# Patient Record
Sex: MALE | Race: WHITE | NOT HISPANIC OR LATINO | Employment: OTHER | URBAN - METROPOLITAN AREA
[De-identification: names, ages, dates, MRNs, and addresses within clinical notes are randomized per-mention and may not be internally consistent; named-entity substitution may affect disease eponyms.]

---

## 2019-01-18 DIAGNOSIS — M54.2 CERVICALGIA: Primary | ICD-10-CM

## 2019-01-28 ENCOUNTER — CLINICAL SUPPORT (OUTPATIENT)
Dept: REHABILITATION | Facility: HOSPITAL | Age: 29
End: 2019-01-28
Attending: FAMILY MEDICINE
Payer: MEDICAID

## 2019-01-28 DIAGNOSIS — M54.50 CHRONIC MIDLINE LOW BACK PAIN WITHOUT SCIATICA: ICD-10-CM

## 2019-01-28 DIAGNOSIS — M54.2 NECK PAIN: ICD-10-CM

## 2019-01-28 DIAGNOSIS — Z98.1 S/P CERVICAL SPINAL FUSION: ICD-10-CM

## 2019-01-28 DIAGNOSIS — G89.29 CHRONIC MIDLINE LOW BACK PAIN WITHOUT SCIATICA: ICD-10-CM

## 2019-01-28 PROCEDURE — 97161 PT EVAL LOW COMPLEX 20 MIN: CPT | Mod: PO

## 2019-01-28 PROCEDURE — 97110 THERAPEUTIC EXERCISES: CPT | Mod: PO
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
## 2019-02-14 ENCOUNTER — TELEPHONE (OUTPATIENT)
Dept: REHABILITATION | Facility: HOSPITAL | Age: 29
End: 2019-02-14

## 2019-02-14 NOTE — TELEPHONE ENCOUNTER
Attempted to contact patient re: No show PT visits.  Received voice mail that indicated his mail box was full and unable to take any messages.

## 2020-03-03 ENCOUNTER — TELEPHONE (OUTPATIENT)
Dept: SURGERY | Facility: HOSPITAL | Age: 30
End: 2020-03-03

## 2020-03-05 ENCOUNTER — ANESTHESIA EVENT (OUTPATIENT)
Dept: SURGERY | Facility: HOSPITAL | Age: 30
End: 2020-03-05
Payer: MEDICAID

## 2020-03-05 ENCOUNTER — HOSPITAL ENCOUNTER (OUTPATIENT)
Dept: PREADMISSION TESTING | Facility: HOSPITAL | Age: 30
Discharge: HOME | End: 2020-03-05
Attending: ORTHOPAEDIC SURGERY
Payer: MEDICAID

## 2020-03-05 DIAGNOSIS — Z01.818 PREOP TESTING: Primary | ICD-10-CM

## 2020-03-05 RX ORDER — SODIUM CHLORIDE, SODIUM LACTATE, POTASSIUM CHLORIDE, CALCIUM CHLORIDE 600; 310; 30; 20 MG/100ML; MG/100ML; MG/100ML; MG/100ML
INJECTION, SOLUTION INTRAVENOUS CONTINUOUS
Status: CANCELLED | OUTPATIENT
Start: 2020-03-05

## 2020-03-05 RX ORDER — LIDOCAINE HYDROCHLORIDE 10 MG/ML
1 INJECTION, SOLUTION EPIDURAL; INFILTRATION; INTRACAUDAL; PERINEURAL ONCE
Status: CANCELLED | OUTPATIENT
Start: 2020-03-05 | End: 2020-03-05

## 2020-03-05 RX ORDER — HYDROCODONE BITARTRATE AND ACETAMINOPHEN 5; 325 MG/1; MG/1
1 TABLET ORAL EVERY 6 HOURS PRN
Status: ON HOLD | COMMUNITY
End: 2020-03-06 | Stop reason: HOSPADM

## 2020-03-05 NOTE — DISCHARGE INSTRUCTIONS
Your surgery is scheduled for 3/6/20.    Please report to Procedure Check In Room on the 2nd FLOOR at 5:30 a.m.       INSTRUCTIONS IMPORTANT!!!  ¨ Do not eat or drink after 12 midnight-including water. OK to brush teeth, no   gum, candy or mints!          ____  Proceed to [facility] on 3/5/20 for additional testing.        ____  No powder, lotions or creams to surgical area.  ____  Please remove all jewelry, including piercings and leave at home.  ____  No money or valuables needed. Please leave at home.  ____  Please bring any documents given by your doctor.  ____  If going home the same day, arrange for a ride home. You will not be able to             drive if Anesthesia was used.  ____  Wear loose fitting clothing. Allow for dressings, bandages.  ____  Stop Aspirin, Ibuprofen, Motrin and Aleve at least 3-5 days before surgery, unless otherwise instructed by your doctor, or the nurse.   You MAY use Tylenol/acetaminophen until day of surgery.  ____  Wash the surgical area with Hibiclens the night before surgery, and again the             morning of surgery.  Be sure to rinse hibiclens off completely (if instructed by   nurse).  ____  If you take diabetic medication, do not take am of surgery unless instructed by Doctor.  ____  Call MD for temperature above 101 degrees or any other signs of infection such as Urinary (bladder) infection, Upper respiratory infection, skin boils, etc.  ____ Stop taking any Fish Oil supplement or any Vitamins that contain Vitamin E at least 5 days prior to surgery.  ____ Do Not wear your contact lenses the day of your procedure.  You may wear your glasses.      ____Do not shave surgical site for 3 days prior to surgery.  ____ Practice Good hand washing before, during, and after procedure.      I have read or had read and explained to me, and understand the above information.  Additional comments or instructions:  For additional questions call [phone]      ANESTHESIA SIDE  EFFECTS  -For the first 24 hours after surgery:  Do not drive, use heavy equipment, make important decisions, or drink alcohol  -It is normal to feel sleepy for several hours.  Rest until you are more awake.  -Have someone stay with you, if needed.  They can watch for problems and help keep you safe.  -Some possible post anesthesia side effects include: nausea and vomiting, sore throat and hoarseness, sleepiness, and dizziness.        Pre-Op Bathing Instructions    Before surgery, you can play an important role in your own health.    Because skin is not sterile, we need to be sure that your skin is as free of germs as possible. By following the instructions below, you can reduce the number of germs on your skin before surgery.    IMPORTANT: You will need to shower with a special soap called Hibiclens*, available at any pharmacy.  If you are allergic to Chlorhexidine (the antiseptic in Hibiclens), use an antibacterial soap such as Dial Soap for your preoperative shower.  You will shower with Hibiclens both the night before your surgery and the morning of your surgery.  Do not use Hibiclens on the head, face or genitals to avoid injury to those areas.    STEP #1: THE NIGHT BEFORE YOUR SURGERY     1. Do not shave the area of your body where your surgery will be performed.  2. Shower and wash your hair and body as usual with your normal soap and shampoo.  3. Rinse your hair and body thoroughly after you shower to remove all soap residue.  4. With your hand, apply one packet of Hibiclens soap to the surgical site.   5. Wash the site gently for five (5) minutes. Do not scrub your skin too hard.   6. Do not wash with your regular soap after Hibiclens is used.  7. Rinse your body thoroughly.  8. Pat yourself dry with a clean, soft towel.  9. Do not use lotion, cream, or powder.  10. Wear clean clothes.    STEP #2: THE MORNING OF YOUR SURGERY     1. Repeat Step #1.    * Not to be used by people allergic to  Chlorhexidine.

## 2020-03-05 NOTE — ANESTHESIA PREPROCEDURE EVALUATION
03/05/2020  Jonatan Reina is a 29 y.o., male scheduled for left femur ORIF on 3/6/2020.    Past Surgical History:   Procedure Laterality Date    arm surgery      CERVICAL SPINE SURGERY      TYMPANOSTOMY TUBE PLACEMENT         Anesthesia Evaluation    I have reviewed the Patient Summary Reports.    I have reviewed the Nursing Notes.   I have reviewed the Medications.     Review of Systems  Anesthesia Hx:  No problems with previous Anesthesia  History of prior surgery of interest to airway management or planning: cervical fusion. Denies Family Hx of Anesthesia complications.    Social:  Non-Smoker, Social Alcohol Use  Illicit Drug Use: Types of drugs include Marijuana,   Hematology/Oncology:  Hematology Normal        Cardiovascular:  Cardiovascular Normal Exercise tolerance: good   Denies Angina.    Pulmonary:  Pulmonary Normal    Renal/:  Renal/ Normal     Hepatic/GI:  Hepatic/GI Normal    Neurological:  Neurology Normal    Endocrine:  Endocrine Normal        Physical Exam  General:  Well nourished    Airway/Jaw/Neck:  Airway Findings: Mouth Opening: Normal Tongue: Normal  General Airway Assessment: Adult  Mallampati: II  Jaw/Neck Findings:  Neck ROM: Normal ROM      Dental:  DENTAL FINDINGS: Normal   Chest/Lungs:  Chest/Lungs Findings: Clear to auscultation, Normal Respiratory Rate     Heart/Vascular:  Heart Findings: Rate: Normal  Rhythm: Regular Rhythm  Sounds: Normal        Mental Status:  Mental Status Findings:  Cooperative, Alert and Oriented         Anesthesia Plan  Type of Anesthesia, risks & benefits discussed:  Anesthesia Type:  general  Patient's Preference:   Intra-op Monitoring Plan: standard ASA monitors  Intra-op Monitoring Plan Comments:   Post Op Pain Control Plan: multimodal analgesia and per primary service following discharge from PACU  Post Op Pain Control Plan Comments:    Induction:   IV  Beta Blocker:  Patient is not currently on a Beta-Blocker (No further documentation required).       Informed Consent: Patient understands risks and agrees with Anesthesia plan.  Questions answered. Anesthesia consent signed with patient.  ASA Score: 2     Day of Surgery Review of History & Physical:  There are no significant changes.      Anesthesia Plan Notes: Anesthesia consent to be signed prior to procedure on 3/6/2020          Ready For Surgery From Anesthesia Perspective.

## 2020-03-06 ENCOUNTER — HOSPITAL ENCOUNTER (OUTPATIENT)
Facility: HOSPITAL | Age: 30
Discharge: HOME | End: 2020-03-06
Attending: ORTHOPAEDIC SURGERY | Admitting: ORTHOPAEDIC SURGERY
Payer: MEDICAID

## 2020-03-06 ENCOUNTER — ANESTHESIA (OUTPATIENT)
Dept: SURGERY | Facility: HOSPITAL | Age: 30
End: 2020-03-06
Payer: MEDICAID

## 2020-03-06 VITALS
HEIGHT: 70 IN | SYSTOLIC BLOOD PRESSURE: 131 MMHG | OXYGEN SATURATION: 99 % | WEIGHT: 190 LBS | DIASTOLIC BLOOD PRESSURE: 80 MMHG | HEART RATE: 86 BPM | BODY MASS INDEX: 27.2 KG/M2 | RESPIRATION RATE: 18 BRPM | TEMPERATURE: 99 F

## 2020-03-06 DIAGNOSIS — S72.492A: ICD-10-CM

## 2020-03-06 LAB
ANION GAP SERPL CALC-SCNC: 6 MMOL/L (ref 8–16)
BASOPHILS # BLD AUTO: 0.03 K/UL (ref 0–0.2)
BASOPHILS NFR BLD: 0.5 % (ref 0–1.9)
BUN SERPL-MCNC: 15 MG/DL (ref 6–20)
CALCIUM SERPL-MCNC: 9.4 MG/DL (ref 8.7–10.5)
CHLORIDE SERPL-SCNC: 104 MMOL/L (ref 95–110)
CO2 SERPL-SCNC: 29 MMOL/L (ref 23–29)
CREAT SERPL-MCNC: 1.1 MG/DL (ref 0.5–1.4)
DIFFERENTIAL METHOD: ABNORMAL
EOSINOPHIL # BLD AUTO: 0.3 K/UL (ref 0–0.5)
EOSINOPHIL NFR BLD: 4 % (ref 0–8)
ERYTHROCYTE [DISTWIDTH] IN BLOOD BY AUTOMATED COUNT: 12.1 % (ref 11.5–14.5)
EST. GFR  (AFRICAN AMERICAN): >60 ML/MIN/1.73 M^2
EST. GFR  (NON AFRICAN AMERICAN): >60 ML/MIN/1.73 M^2
GLUCOSE SERPL-MCNC: 88 MG/DL (ref 70–110)
HCT VFR BLD AUTO: 36.2 % (ref 40–54)
HGB BLD-MCNC: 12.1 G/DL (ref 14–18)
IMM GRANULOCYTES # BLD AUTO: 0.02 K/UL (ref 0–0.04)
IMM GRANULOCYTES NFR BLD AUTO: 0.3 % (ref 0–0.5)
LYMPHOCYTES # BLD AUTO: 1.6 K/UL (ref 1–4.8)
LYMPHOCYTES NFR BLD: 25.4 % (ref 18–48)
MCH RBC QN AUTO: 29.9 PG (ref 27–31)
MCHC RBC AUTO-ENTMCNC: 33.4 G/DL (ref 32–36)
MCV RBC AUTO: 89 FL (ref 82–98)
MONOCYTES # BLD AUTO: 0.6 K/UL (ref 0.3–1)
MONOCYTES NFR BLD: 9.6 % (ref 4–15)
NEUTROPHILS # BLD AUTO: 3.8 K/UL (ref 1.8–7.7)
NEUTROPHILS NFR BLD: 60.2 % (ref 38–73)
NRBC BLD-RTO: 0 /100 WBC
PLATELET # BLD AUTO: 281 K/UL (ref 150–350)
PMV BLD AUTO: 9.2 FL (ref 9.2–12.9)
POTASSIUM SERPL-SCNC: 4.3 MMOL/L (ref 3.5–5.1)
RBC # BLD AUTO: 4.05 M/UL (ref 4.6–6.2)
SODIUM SERPL-SCNC: 139 MMOL/L (ref 136–145)
WBC # BLD AUTO: 6.25 K/UL (ref 3.9–12.7)

## 2020-03-06 PROCEDURE — 63600175 PHARM REV CODE 636 W HCPCS

## 2020-03-06 PROCEDURE — 71000015 HC POSTOP RECOV 1ST HR: Performed by: ORTHOPAEDIC SURGERY

## 2020-03-06 PROCEDURE — 71000033 HC RECOVERY, INTIAL HOUR: Performed by: ORTHOPAEDIC SURGERY

## 2020-03-06 PROCEDURE — 80048 BASIC METABOLIC PNL TOTAL CA: CPT

## 2020-03-06 PROCEDURE — 37000008 HC ANESTHESIA 1ST 15 MINUTES: Performed by: ORTHOPAEDIC SURGERY

## 2020-03-06 PROCEDURE — 36000711: Performed by: ORTHOPAEDIC SURGERY

## 2020-03-06 PROCEDURE — 63600175 PHARM REV CODE 636 W HCPCS: Performed by: STUDENT IN AN ORGANIZED HEALTH CARE EDUCATION/TRAINING PROGRAM

## 2020-03-06 PROCEDURE — 85025 COMPLETE CBC W/AUTO DIFF WBC: CPT

## 2020-03-06 PROCEDURE — C1769 GUIDE WIRE: HCPCS | Performed by: ORTHOPAEDIC SURGERY

## 2020-03-06 PROCEDURE — 25000003 PHARM REV CODE 250: Performed by: STUDENT IN AN ORGANIZED HEALTH CARE EDUCATION/TRAINING PROGRAM

## 2020-03-06 PROCEDURE — 01360 ANES OPEN PX LOWER 1/3 FEMUR: CPT | Performed by: ORTHOPAEDIC SURGERY

## 2020-03-06 PROCEDURE — 63600175 PHARM REV CODE 636 W HCPCS: Performed by: ORTHOPAEDIC SURGERY

## 2020-03-06 PROCEDURE — 71000016 HC POSTOP RECOV ADDL HR: Performed by: ORTHOPAEDIC SURGERY

## 2020-03-06 PROCEDURE — C1729 CATH, DRAINAGE: HCPCS | Performed by: ORTHOPAEDIC SURGERY

## 2020-03-06 PROCEDURE — 25000003 PHARM REV CODE 250: Performed by: NURSE ANESTHETIST, CERTIFIED REGISTERED

## 2020-03-06 PROCEDURE — 63600175 PHARM REV CODE 636 W HCPCS: Performed by: NURSE ANESTHETIST, CERTIFIED REGISTERED

## 2020-03-06 PROCEDURE — C1713 ANCHOR/SCREW BN/BN,TIS/BN: HCPCS | Performed by: ORTHOPAEDIC SURGERY

## 2020-03-06 PROCEDURE — 37000009 HC ANESTHESIA EA ADD 15 MINS: Performed by: ORTHOPAEDIC SURGERY

## 2020-03-06 PROCEDURE — 63600175 PHARM REV CODE 636 W HCPCS: Performed by: ANESTHESIOLOGY

## 2020-03-06 PROCEDURE — 27201423 OPTIME MED/SURG SUP & DEVICES STERILE SUPPLY: Performed by: ORTHOPAEDIC SURGERY

## 2020-03-06 PROCEDURE — 36415 COLL VENOUS BLD VENIPUNCTURE: CPT

## 2020-03-06 PROCEDURE — 36000710: Performed by: ORTHOPAEDIC SURGERY

## 2020-03-06 DEVICE — IMPLANTABLE DEVICE: Type: IMPLANTABLE DEVICE | Site: KNEE | Status: FUNCTIONAL

## 2020-03-06 DEVICE — SCREW CORTEX 4.5 46M: Type: IMPLANTABLE DEVICE | Site: KNEE | Status: FUNCTIONAL

## 2020-03-06 DEVICE — GUIDE WR NONT 2.0X230: Type: IMPLANTABLE DEVICE | Site: KNEE | Status: FUNCTIONAL

## 2020-03-06 DEVICE — SCREW CORTEX 4.5 44M: Type: IMPLANTABLE DEVICE | Site: KNEE | Status: FUNCTIONAL

## 2020-03-06 DEVICE — SCREW CORTEX 4.5 48M: Type: IMPLANTABLE DEVICE | Site: KNEE | Status: FUNCTIONAL

## 2020-03-06 DEVICE — SCREW BONE CANN 16MM 6.5X70MM: Type: IMPLANTABLE DEVICE | Site: KNEE | Status: FUNCTIONAL

## 2020-03-06 DEVICE — SCREW CANN 16MM THRD 6.5X75 SS: Type: IMPLANTABLE DEVICE | Site: KNEE | Status: FUNCTIONAL

## 2020-03-06 DEVICE — WIRE 2.8 X 300 MM THREADED: Type: IMPLANTABLE DEVICE | Site: KNEE | Status: FUNCTIONAL

## 2020-03-06 DEVICE — SCREW CORTEX 4.5 40M: Type: IMPLANTABLE DEVICE | Site: KNEE | Status: FUNCTIONAL

## 2020-03-06 RX ORDER — SODIUM CHLORIDE, SODIUM LACTATE, POTASSIUM CHLORIDE, CALCIUM CHLORIDE 600; 310; 30; 20 MG/100ML; MG/100ML; MG/100ML; MG/100ML
INJECTION, SOLUTION INTRAVENOUS CONTINUOUS
Status: DISCONTINUED | OUTPATIENT
Start: 2020-03-06 | End: 2020-03-06 | Stop reason: HOSPADM

## 2020-03-06 RX ORDER — ONDANSETRON 2 MG/ML
INJECTION INTRAMUSCULAR; INTRAVENOUS
Status: COMPLETED
Start: 2020-03-06 | End: 2020-03-06

## 2020-03-06 RX ORDER — LIDOCAINE HYDROCHLORIDE 10 MG/ML
1 INJECTION, SOLUTION EPIDURAL; INFILTRATION; INTRACAUDAL; PERINEURAL ONCE
Status: DISCONTINUED | OUTPATIENT
Start: 2020-03-06 | End: 2020-03-06 | Stop reason: HOSPADM

## 2020-03-06 RX ORDER — PROPOFOL 10 MG/ML
INJECTION, EMULSION INTRAVENOUS CONTINUOUS PRN
Status: DISCONTINUED | OUTPATIENT
Start: 2020-03-06 | End: 2020-03-06

## 2020-03-06 RX ORDER — ONDANSETRON 2 MG/ML
4 INJECTION INTRAMUSCULAR; INTRAVENOUS EVERY 12 HOURS PRN
Status: DISCONTINUED | OUTPATIENT
Start: 2020-03-06 | End: 2020-03-06 | Stop reason: HOSPADM

## 2020-03-06 RX ORDER — ONDANSETRON 2 MG/ML
4 INJECTION INTRAMUSCULAR; INTRAVENOUS ONCE
Status: COMPLETED | OUTPATIENT
Start: 2020-03-06 | End: 2020-03-06

## 2020-03-06 RX ORDER — MIDAZOLAM HYDROCHLORIDE 1 MG/ML
INJECTION, SOLUTION INTRAMUSCULAR; INTRAVENOUS
Status: DISCONTINUED | OUTPATIENT
Start: 2020-03-06 | End: 2020-03-06

## 2020-03-06 RX ORDER — TRANEXAMIC ACID 100 MG/ML
INJECTION, SOLUTION INTRAVENOUS
Status: DISCONTINUED | OUTPATIENT
Start: 2020-03-06 | End: 2020-03-06

## 2020-03-06 RX ORDER — BUPIVACAINE HYDROCHLORIDE 7.5 MG/ML
INJECTION, SOLUTION EPIDURAL; RETROBULBAR
Status: COMPLETED | OUTPATIENT
Start: 2020-03-06 | End: 2020-03-06

## 2020-03-06 RX ORDER — ASPIRIN 81 MG/1
81 TABLET ORAL DAILY
Qty: 30 TABLET | Refills: 0 | Status: ON HOLD | OUTPATIENT
Start: 2020-03-06 | End: 2020-04-01 | Stop reason: HOSPADM

## 2020-03-06 RX ORDER — DEXAMETHASONE SODIUM PHOSPHATE 4 MG/ML
INJECTION, SOLUTION INTRA-ARTICULAR; INTRALESIONAL; INTRAMUSCULAR; INTRAVENOUS; SOFT TISSUE
Status: DISCONTINUED | OUTPATIENT
Start: 2020-03-06 | End: 2020-03-06

## 2020-03-06 RX ORDER — ONDANSETRON 2 MG/ML
INJECTION INTRAMUSCULAR; INTRAVENOUS
Status: DISCONTINUED | OUTPATIENT
Start: 2020-03-06 | End: 2020-03-06

## 2020-03-06 RX ORDER — NALOXONE HCL 0.4 MG/ML
0.04 VIAL (ML) INJECTION
Status: DISCONTINUED | OUTPATIENT
Start: 2020-03-06 | End: 2020-03-06 | Stop reason: HOSPADM

## 2020-03-06 RX ORDER — KETAMINE HYDROCHLORIDE 100 MG/ML
INJECTION, SOLUTION INTRAMUSCULAR; INTRAVENOUS
Status: DISCONTINUED | OUTPATIENT
Start: 2020-03-06 | End: 2020-03-06

## 2020-03-06 RX ORDER — KETOROLAC TROMETHAMINE 30 MG/ML
INJECTION, SOLUTION INTRAMUSCULAR; INTRAVENOUS
Status: DISCONTINUED | OUTPATIENT
Start: 2020-03-06 | End: 2020-03-06

## 2020-03-06 RX ORDER — CEFAZOLIN SODIUM 2 G/50ML
2 SOLUTION INTRAVENOUS
Status: DISCONTINUED | OUTPATIENT
Start: 2020-03-06 | End: 2020-03-06 | Stop reason: HOSPADM

## 2020-03-06 RX ORDER — SODIUM CHLORIDE, SODIUM LACTATE, POTASSIUM CHLORIDE, CALCIUM CHLORIDE 600; 310; 30; 20 MG/100ML; MG/100ML; MG/100ML; MG/100ML
INJECTION, SOLUTION INTRAVENOUS CONTINUOUS PRN
Status: DISCONTINUED | OUTPATIENT
Start: 2020-03-06 | End: 2020-03-06

## 2020-03-06 RX ORDER — HYDROCODONE BITARTRATE AND ACETAMINOPHEN 7.5; 325 MG/1; MG/1
1 TABLET ORAL EVERY 6 HOURS PRN
Qty: 40 TABLET | Refills: 0 | Status: ON HOLD | OUTPATIENT
Start: 2020-03-06 | End: 2020-04-01 | Stop reason: HOSPADM

## 2020-03-06 RX ORDER — FENTANYL CITRATE 50 UG/ML
INJECTION, SOLUTION INTRAMUSCULAR; INTRAVENOUS
Status: DISCONTINUED | OUTPATIENT
Start: 2020-03-06 | End: 2020-03-06

## 2020-03-06 RX ORDER — GLYCOPYRROLATE 0.2 MG/ML
INJECTION INTRAMUSCULAR; INTRAVENOUS
Status: DISCONTINUED | OUTPATIENT
Start: 2020-03-06 | End: 2020-03-06

## 2020-03-06 RX ORDER — CEFAZOLIN SODIUM 2 G/50ML
2 SOLUTION INTRAVENOUS ONCE
Status: COMPLETED | OUTPATIENT
Start: 2020-03-06 | End: 2020-03-06

## 2020-03-06 RX ORDER — ROPIVACAINE HYDROCHLORIDE 5 MG/ML
INJECTION, SOLUTION EPIDURAL; INFILTRATION; PERINEURAL
Status: COMPLETED | OUTPATIENT
Start: 2020-03-06 | End: 2020-03-06

## 2020-03-06 RX ORDER — SODIUM CHLORIDE 9 MG/ML
INJECTION, SOLUTION INTRAVENOUS CONTINUOUS
Status: DISCONTINUED | OUTPATIENT
Start: 2020-03-06 | End: 2020-03-06 | Stop reason: HOSPADM

## 2020-03-06 RX ADMIN — CEFAZOLIN SODIUM 2 G: 2 SOLUTION INTRAVENOUS at 11:03

## 2020-03-06 RX ADMIN — GLYCOPYRROLATE 0.2 MG: 0.2 INJECTION, SOLUTION INTRAMUSCULAR; INTRAVENOUS at 12:03

## 2020-03-06 RX ADMIN — SODIUM CHLORIDE, SODIUM LACTATE, POTASSIUM CHLORIDE, AND CALCIUM CHLORIDE: .6; .31; .03; .02 INJECTION, SOLUTION INTRAVENOUS at 11:03

## 2020-03-06 RX ADMIN — ONDANSETRON 4 MG: 2 INJECTION INTRAMUSCULAR; INTRAVENOUS at 08:03

## 2020-03-06 RX ADMIN — MIDAZOLAM 2 MG: 1 INJECTION INTRAMUSCULAR; INTRAVENOUS at 11:03

## 2020-03-06 RX ADMIN — FENTANYL CITRATE 100 MCG: 50 INJECTION, SOLUTION INTRAMUSCULAR; INTRAVENOUS at 11:03

## 2020-03-06 RX ADMIN — BUPIVACAINE HYDROCHLORIDE 1.6 ML: 7.5 INJECTION, SOLUTION EPIDURAL; RETROBULBAR at 11:03

## 2020-03-06 RX ADMIN — TRANEXAMIC ACID 1000 MG: 100 INJECTION, SOLUTION INTRAVENOUS at 03:03

## 2020-03-06 RX ADMIN — KETOROLAC TROMETHAMINE 30 MG: 30 INJECTION, SOLUTION INTRAMUSCULAR; INTRAVENOUS at 03:03

## 2020-03-06 RX ADMIN — ONDANSETRON 4 MG: 2 INJECTION, SOLUTION INTRAMUSCULAR; INTRAVENOUS at 01:03

## 2020-03-06 RX ADMIN — ROPIVACAINE HYDROCHLORIDE 20 ML: 5 INJECTION, SOLUTION EPIDURAL; INFILTRATION; PERINEURAL at 11:03

## 2020-03-06 RX ADMIN — DEXAMETHASONE SODIUM PHOSPHATE 4 MG: 4 INJECTION, SOLUTION INTRA-ARTICULAR; INTRALESIONAL; INTRAMUSCULAR; INTRAVENOUS; SOFT TISSUE at 11:03

## 2020-03-06 RX ADMIN — KETAMINE HYDROCHLORIDE 10 MG: 100 INJECTION, SOLUTION, CONCENTRATE INTRAMUSCULAR; INTRAVENOUS at 03:03

## 2020-03-06 RX ADMIN — PROPOFOL 120 MCG/KG/MIN: 10 INJECTION, EMULSION INTRAVENOUS at 11:03

## 2020-03-06 NOTE — H&P
Chief Complaint: Distal femur fx    Patient comes in for femur fracture after fall on Tuesday 2/25/2020.      History of Present Illness  The patient is a 29-year-old male who presents for evaluation of a left knee injury. He missed a step and landed directly on his left knee on 2/25/2020. He had immediate pain and went to the emergency room where he was diagnosed with a distal femur fracture. He presents here for evaluation and further treatment. At this time he has a knee immobilizer in place. His knee pain is moderate. He is using crutches and not weightbearing on the left lower extremity. No other injuries from the present fall..    Of note the patient has a history of prior left knee injury. In 2006 he reports jumping a fence and landing directly on his left leg. At that time he experienced a pop and later that evening had severe swelling in his knee. He never sought treatment for this injury, as his swelling and pain resolved. However since then he has had knee instability with knee giving way episodes and experiencing shifting when pivoting on his left leg.    Location: Left knee    Severity: Pain 10 out of 10    Timing: Pain worse with bending the knee or weightbearing    Modifying factors: Pain better with rest and elevation    Quality: Dull intermittent pain    Duration: Onset above    Context: Low energy trauma    Associated signs and symptoms: No neurologic complaints.       Allergies   · No Known Drug Allergies    Current Meds    Medication Name Instruction   Percocet 5-325 MG Oral Tablet (Oxycodone-Acetaminophen)    Zofran 4 MG Oral Tablet (Ondansetron HCl)      Review of Systems  See chart for complete patient survey including review of systems, medical history, surgical history, family history, and social history; this document was personally reviewed today.      Results/Data    Study: Radiographs of the left knee    Results: 2 views of the left knee are taken including an AP and a lateral. These  demonstrate a distal femur fracture in the sagittal plane through the intercondylar notch extending up the medial condyle into the medial metaphyseal region. No other acute fractures or dislocations are noted in these images.      Vitals   Recorded: [id number] 11:47AM   Height 5 ft 10 in   Weight 180 lb    BMI Calculated 25.83   BSA Calculated 2   Systolic 124, LUE, Sitting   Diastolic 77, LUE, Sitting   Heart Rate 84, L Brachial Artery   Pulse Quality Normal, L Brachial Artery   Pain Scale 10   Location: left lower leg     Physical Exam  Vital signs are noted in the chart above.     The patient appears generally well.    The patient is oriented x 3.    Mood and affect are appropriate and normal.     Gait is aided by 2 crutches and nonweightbearing on the left lower extremity.    The left knee is examined and found to have a large effusion. There are abrasions at the anterior lateral knee adjacent to the patellar tendon. There is tenderness to palpation at the distal femur medially. The knee range of motion is not tested due to known fracture. Gross motor and sensory function are otherwise intact about the extremity. The distal extremities warm and well-perfused.    The right knee is examined and found to be grossly atraumatic with no effusion warmth or swelling. The knee range of motion is 0 to 130 degrees. The knee is stable to ligamentous examination. The gross motor and sensory function throughout the extremity are normal. The distal extremity is warm and well-perfused.      Assessment   1. Other closed fracture of distal end of left femur, initial encounter (S72.690A)   · The patient has a distal femur fracture which will benefit from surgical fixation to avoid      displacement at the articular surface. His fracture extends from the intercondylar notch to      the metaphyseal region above the medial condyle. The patient did have a CT scan done      when he was seen in the emergency department demonstrating the  nature of his injury      Overall we will plan to do surgery to provide definitive fixation. I discussed      treatment with the patient and he agrees to proceed. Regarding his prior knee injury, it      sounds like he may have had an ACL rupture in the past however I am unable to      fully examine him due to his known fracture. We will treat his distal femur fracture with      open reduction and internal fixation and then if he requires subsequent treatment for      symptomatic knee instability we will go from there.    Plan  1. Plan for ORIF left distal femur on 3/6/2020  2. Patient nonweightbearing on the left lower extremity  3. Patient will follow-up after surgery

## 2020-03-06 NOTE — DISCHARGE INSTRUCTIONS
Time Line After Distal Femur Fracture Surgery    Day 1-2 after surgery   Keep leg elevated at all times with knee brace in place  Apply ice to the leg  Do not put any weight on the operative leg  Keep the brace on  Sponge bathe if needed  Keep dressing clean and dry      3/8/2020 Wilmer            Remove the Hemovac drain by pulling the drain straight upward toward your hip until the entire tube is out            Throw away the drain afterwards            Do not remove any of the dressings            See below for more information about the drain    Day 3 after surgery   Continue taking pain medication if needed    Day 10-14   Follow up with surgeon for suture or staple removal         Discharge Instructions: Caring for Your Hemovac Drainage Tube  You have been discharged with a Hemovac drainage tube. The tube was placed in your incision to remove fluid and is attached to a drain or collection device. It will help healing and reduce the risk of infection. Expect to see fluid and blood in the drain.  There is a bandage at the site where the tube is placed. This is to protect the open area from infection.     Here's what you need to do to care for your Hemovac drainage tube.  General guidelines  · Dont sleep on the same side as the tube.  · Secure the tube and drain container inside your clothing. This will prevent the tube from being pulled out.  · Take a sponge bath to avoid getting your bandage and tube site wet.    Empty the drain  Empty your drain when it is full.    · Lift the stopper. The drain will expand.  · Turn the drain upside down.  · Drain the fluid into a measuring cup.  · Record the amount of fluid each time you empty the drain.    · Place the empty drain on a hard surface and press down until it is flat.  · Close the cork stopper device.    Dressing  Do not take off your dressing; it will be changed at your office visit.        When to call your doctor  Call your doctor right away if you have any of  the following:  · Pain, swelling, or fluid around the tube  · Redness or warmth around the incision or fluid draining from the incision  · Nausea and vomiting  · Fever above 101.4F or chills  · An incision that does not heal; stitches that become infected or loose  · A foul smell from the incision site      ANESTHESIA  -For the first 24 hours after surgery:  Do not drive, use heavy equipment, make important decisions, or drink alcohol  -It is normal to feel sleepy for several hours.  Rest until you are more awake.  -Have someone stay with you, if needed.  They can watch for problems and help keep you safe.  -Some possible post anesthesia side effects include: nausea and vomiting, sore throat and hoarseness, sleepiness, and dizziness.    PAIN  -If you have pain after surgery, pain medicine will help you feel better.  Take it as directed, before pain becomes severe.  Most pain relievers taken by mouth need at least 20-30 minutes to start working.  -Do not drive or drink alcohol while taking pain medicine.  -Pain medication can upset your stomach.  Taking them with a little food may help.  -Other ways to help control pain: elevation, ice, and relaxation  -Call your surgeon if still having unmanageable pain an hour after taking pain medicine.  -Pain medicine can cause constipation.  Taking an over-the counter stool softener while on prescription pain medicine and drinking plenty of fluids can prevent this side effect.  -Call your surgeon if you have severe side effects like: breathing problems, trouble waking up, dizziness, confusion, or severe constipation.    NAUSEA  -Some people have nausea after surgery.  This is often because of anesthesia, pain, pain medicine, or the stress of surgery.  -Do not push yourself to eat.  Start off with clear liquids and soup.  Slowly move to solid foods.  Don't eat fatty, rich, spicy foods at first.  Eat smaller amounts.  -If you develop persistent nausea and vomiting please notify  your surgeon immediately.    BLEEDING  -Different types of surgery require different types of care and dressing changes.  It is important to follow all instructions and advice from your surgeon.  Change dressing as directed.  Call your surgeon for any concerns regarding postop bleeding.    SIGNS OF INFECTION  -Signs of infection include: fever, swelling, drainage, and redness  -Notify your surgeon if you have a fever of 100.4 F (38.0 C) or higher.  -Notify your surgeon if you notice redness, swelling, increased pain, pus, or a foul smell at the incision site.    Aspirin, ASA oral tablets  What is this medicine?  ASPIRIN (AS pir in) is a pain reliever. It is used to treat mild pain and fever. This medicine is also used as directed by a doctor to prevent and to treat heart attacks, to prevent strokes, and to treat arthritis or inflammation.  How should I use this medicine?  Take this medicine by mouth with a glass of water. Follow the directions on the package or prescription label. You can take this medicine with or without food. If it upsets your stomach, take it with food. Do not take your medicine more often than directed.  Talk to your pediatrician regarding the use of this medicine in children. While this drug may be prescribed for children as young as 12 years of age for selected conditions, precautions do apply. Children and teenagers should not use this medicine to treat chicken pox or flu symptoms unless directed by a doctor.  Patients over 65 years old may have a stronger reaction and need a smaller dose.  What side effects may I notice from receiving this medicine?  Side effects that you should report to your doctor or health care professional as soon as possible:  · allergic reactions like skin rash, itching or hives, swelling of the face, lips, or tongue  · breathing problems  · changes in hearing, ringing in the ears  · confusion  · general ill feeling or flu-like symptoms  · pain on  swallowing  · redness, blistering, peeling or loosening of the skin, including inside the mouth or nose  · signs and symptoms of bleeding such as bloody or black, tarry stools; red or dark-brown urine; spitting up blood or brown material that looks like coffee grounds; red spots on the skin; unusual bruising or bleeding from the eye, gums, or nose  · trouble passing urine or change in the amount of urine  · unusually weak or tired  · yellowing of the eyes or skin  Side effects that usually do not require medical attention (report to your doctor or health care professional if they continue or are bothersome):  · diarrhea or constipation  · headache  · nausea, vomiting  · stomach gas, heartburn  What may interact with this medicine?  Do not take this medicine with any of the following medications:  · cidofovir  · ketorolac  · probenecid  This medicine may also interact with the following medications:  · alcohol  · alendronate  · bismuth subsalicylate  · flavocoxid  · herbal supplements like feverfew, garlic, owen, ginkgo biloba, horse chestnut  · medicines for diabetes or glaucoma like acetazolamide, methazolamide  · medicines for gout  · medicines that treat or prevent blood clots like enoxaparin, heparin, ticlopidine, warfarin  · other aspirin and aspirin-like medicines  · NSAIDs, medicines for pain and inflammation, like ibuprofen or naproxen  · pemetrexed  · sulfinpyrazone  · varicella live vaccine  What if I miss a dose?  If you are taking this medicine on a regular schedule and miss a dose, take it as soon as you can. If it is almost time for your next dose, take only that dose. Do not take double or extra doses.  Where should I keep my medicine?  Keep out of the reach of children.  Store at room temperature between 15 and 30 degrees C (59 and 86 degrees F). Protect from heat and moisture. Do not use this medicine if it has a strong vinegar smell. Throw away any unused medicine after the expiration date.  What  should I tell my health care provider before I take this medicine?  They need to know if you have any of these conditions:  · anemia  · asthma  · bleeding problems  · child with chickenpox, the flu, or other viral infection  · diabetes  · gout  · if you frequently drink alcohol containing drinks  · kidney disease  · liver disease  · low level of vitamin K  · lupus  · smoke tobacco  · stomach ulcers or other problems  · an unusual or allergic reaction to aspirin, tartrazine dye, other medicines, dyes, or preservatives  · pregnant or trying to get pregnant  · breast-feeding  What should I watch for while using this medicine?  If you are treating yourself for pain, tell your doctor or health care professional if the pain lasts more than 10 days, if it gets worse, or if there is a new or different kind of pain. Tell your doctor if you see redness or swelling. Also, check with your doctor if you have a fever that lasts for more than 3 days. Only take this medicine to prevent heart attacks or blood clotting if prescribed by your doctor or health care professional.  Do not take aspirin or aspirin-like medicines with this medicine. Too much aspirin can be dangerous. Always read the labels carefully.  This medicine can irritate your stomach or cause bleeding problems. Do not smoke cigarettes or drink alcohol while taking this medicine. Do not lie down for 30 minutes after taking this medicine to prevent irritation to your throat.  If you are scheduled for any medical or dental procedure, tell your healthcare provider that you are taking this medicine. You may need to stop taking this medicine before the procedure.  This medicine may be used to treat migraines. If you take migraine medicines for 10 or more days a month, your migraines may get worse. Keep a diary of headache days and medicine use. Contact your healthcare professional if your migraine attacks occur more frequently.  NOTE:This sheet is a summary. It may not  cover all possible information. If you have questions about this medicine, talk to your doctor, pharmacist, or health care provider. Copyright© 2017 [employer]    Acetaminophen; Hydrocodone tablets or capsules  What is this medicine?  ACETAMINOPHEN; HYDROCODONE (a set a RONNIE vick fen; tere droe KOE done) is a pain reliever. It is used to treat moderate to severe pain.  How should I use this medicine?  Take this medicine by mouth with a glass of water. Follow the directions on the prescription label. You can take it with or without food. If it upsets your stomach, take it with food. Do not take your medicine more often than directed.  A special MedGuide will be given to you by the pharmacist with each prescription and refill. Be sure to read this information carefully each time.  Talk to your pediatrician regarding the use of this medicine in children. Special care may be needed.  What side effects may I notice from receiving this medicine?  Side effects that you should report to your doctor or health care professional as soon as possible:  · allergic reactions like skin rash, itching or hives, swelling of the face, lips, or tongue  · breathing problems  · confusion  · redness, blistering, peeling or loosening of the skin, including inside the mouth  · signs and symptoms of low blood pressure like dizziness; feeling faint or lightheaded, falls; unusually weak or tired  · trouble passing urine or change in the amount of urine  · yellowing of the eyes or skin  Side effects that usually do not require medical attention (report to your doctor or health care professional if they continue or are bothersome):  · constipation  · dry mouth  · nausea, vomiting  · tiredness  What may interact with this medicine?  This medicine may interact with the following medications:  · alcohol  · antiviral medicines for HIV or AIDS  · atropine  · antihistamines for allergy, cough and cold  · certain antibiotics like erythromycin,  clarithromycin  · certain medicines for anxiety or sleep  · certain medicines for bladder problems like oxybutynin, tolterodine  · certain medicines for depression like amitriptyline, fluoxetine, sertraline  · certain medicines for fungal infections like ketoconazole and itraconazole  · certain medicines for Parkinson's disease like benztropine, trihexyphenidyl  · certain medicines for seizures like carbamazepine, phenobarbital, phenytoin, primidone  · certain medicines for stomach problems like dicyclomine, hyoscyamine  · certain medicines for travel sickness like scopolamine  · general anesthetics like halothane, isoflurane, methoxyflurane, propofol  · ipratropium  · local anesthetics like lidocaine, pramoxine, tetracaine  · MAOIs like Carbex, Eldepryl, Marplan, Nardil, and Parnate  · medicines that relax muscles for surgery  · other medicines with acetaminophen  · other narcotic medicines for pain or cough  · phenothiazines like chlorpromazine, mesoridazine, prochlorperazine, thioridazine  · rifampin  What if I miss a dose?  If you miss a dose, take it as soon as you can. If it is almost time for your next dose, take only that dose. Do not take double or extra doses.  Where should I keep my medicine?  Keep out of the reach of children. This medicine can be abused. Keep your medicine in a safe place to protect it from theft. Do not share this medicine with anyone. Selling or giving away this medicine is dangerous and against the law.  This medicine may cause accidental overdose and death if it taken by other adults, children, or pets. Mix any unused medicine with a substance like cat litter or coffee grounds. Then throw the medicine away in a sealed container like a sealed bag or a coffee can with a lid. Do not use the medicine after the expiration date.  Store at room temperature between 15 and 30 degrees C (59 and 86 degrees F).  What should I tell my health care provider before I take this medicine?  They need  to know if you have any of these conditions:  · brain tumor  · Crohn's disease, inflammatory bowel disease, or ulcerative colitis  · drug abuse or addiction  · head injury  · heart or circulation problems  · if you often drink alcohol  · kidney disease or problems going to the bathroom  · liver disease  · lung disease, asthma, or breathing problems  · an unusual or allergic reaction to acetaminophen, hydrocodone, other opioid analgesics, other medicines, foods, dyes, or preservatives  · pregnant or trying to get pregnant  · breast-feeding  What should I watch for while using this medicine?  Tell your doctor or health care professional if your pain does not go away, if it gets worse, or if you have new or a different type of pain. You may develop tolerance to the medicine. Tolerance means that you will need a higher dose of the medicine for pain relief. Tolerance is normal and is expected if you take the medicine for a long time.  Do not suddenly stop taking your medicine because you may develop a severe reaction. Your body becomes used to the medicine. This does NOT mean you are addicted. Addiction is a behavior related to getting and using a drug for a non-medical reason. If you have pain, you have a medical reason to take pain medicine. Your doctor will tell you how much medicine to take. If your doctor wants you to stop the medicine, the dose will be slowly lowered over time to avoid any side effects.  There are different types of narcotic medicines (opiates). If you take more than one type at the same time or if you are taking another medicine that also causes drowsiness, you may have more side effects. Give your health care provider a list of all medicines you use. Your doctor will tell you how much medicine to take. Do not take more medicine than directed. Call emergency for help if you have problems breathing or unusual sleepiness.  Do not take other medicines that contain acetaminophen with this medicine.  Always read labels carefully. If you have questions, ask your doctor or pharmacist.  If you take too much acetaminophen get medical help right away. Too much acetaminophen can be very dangerous and cause liver damage. Even if you do not have symptoms, it is important to get help right away.  You may get drowsy or dizzy. Do not drive, use machinery, or do anything that needs mental alertness until you know how this medicine affects you. Do not stand or sit up quickly, especially if you are an older patient. This reduces the risk of dizzy or fainting spells. Alcohol may interfere with the effect of this medicine. Avoid alcoholic drinks.  The medicine will cause constipation. Try to have a bowel movement at least every 2 to 3 days. If you do not have a bowel movement for 3 days, call your doctor or health care professional.  Your mouth may get dry. Chewing sugarless gum or sucking hard candy, and drinking plenty of water may help. Contact your doctor if the problem does not go away or is severe.  NOTE:This sheet is a summary. It may not cover all possible information. If you have questions about this medicine, talk to your doctor, pharmacist, or health care provider. Copyright© 2017 [employer]

## 2020-03-06 NOTE — PT/OT/SLP PROGRESS
Physical Therapy      Patient Name:  Jonatan Reina   MRN:  [id number]    Spoke with same day Nursing staff patient has his axillary crutches. May not need PT service.    Pascual Delacruz, PT

## 2020-03-06 NOTE — BRIEF OP NOTE
[facility]  Brief Operative Note    Surgery Date: 3/6/2020     Surgeon(s) and Role:     * Garry Sosa IV, MD - Primary    Assisting Surgeon: None    Pre-op Diagnosis:  Closed comminuted intra-articular fracture of distal femur, left, initial encounter [S72.492A]    Post-op Diagnosis:  Post-Op Diagnosis Codes:     * Closed comminuted intra-articular fracture of distal femur, left, initial encounter [S72.492A]    Procedure(s) (LRB):  ORIF, FRACTURE, FEMUR DISTAL (Left)    Anesthesia: General    Description of the findings of the procedure(s): see operative note    Estimated Blood Loss: 150 mL         Specimens:   Specimen (12h ago, onward)    None            Discharge Note    OUTCOME: Patient tolerated treatment/procedure well without complication and is now ready for discharge.    DISPOSITION: Home or Self Care    FINAL DIAGNOSIS:  <principal problem not specified>    FOLLOWUP: In clinic    DISCHARGE INSTRUCTIONS:      Patient will remove Hemovac drain on Sunday morning POD 2.     Discharge Procedure Orders   Diet Adult Regular     No driving until:   Order Comments: Until cleared by surgeon     Keep surgical extremity elevated     Ice to affected area     Sponge bath only until clinic visit     Weight bearing restrictions (specify):   Order Comments: NWHILARY NOVAK

## 2020-03-06 NOTE — TRANSFER OF CARE
"Anesthesia Transfer of Care Note    Patient: Jonatan Reina    Procedure(s) Performed: Procedure(s) (LRB):  ORIF, FRACTURE, FEMUR DISTAL (Left)    Patient location: PACU    Anesthesia Type: MAC    Transport from OR: Transported from OR on room air with adequate spontaneous ventilation    Post pain: adequate analgesia    Post assessment: no apparent anesthetic complications and tolerated procedure well    Post vital signs: stable    Level of consciousness: responds to stimulation and sedated    Nausea/Vomiting: no nausea/vomiting    Complications: none    Transfer of care protocol was followed      Last vitals:   Visit Vitals  /83 (BP Location: Right arm, Patient Position: Lying)   Pulse 71   Temp 36.7 °C (98.1 °F) (Skin)   Resp 18   Ht 5' 10" (1.778 m)   Wt 86.2 kg (190 lb)   SpO2 98%   BMI 27.26 kg/m²     "

## 2020-03-06 NOTE — PLAN OF CARE
Received report from [place] BETANCOURT rn, patient doing well, vss, no pain, no changes is assessment

## 2020-03-06 NOTE — PLAN OF CARE
Patient to OPS, vss, pain controlled, patient has positive feeling and movement to both lower extremities and has urinated

## 2020-03-06 NOTE — ANESTHESIA PROCEDURE NOTES
Spinal    Diagnosis: femur fracture  Patient location during procedure: OR  Start time: 3/6/2020 11:40 AM  Timeout: 3/6/2020 11:40 AM  End time: 3/6/2020 11:45 AM    Staffing  Authorizing Provider: Emre Saravia MD  Performing Provider: Dejon Vallecillo MD    Spinal Block  Patient position: sitting  Prep: ChloraPrep  Patient monitoring: heart rate, cardiac monitor, continuous pulse ox and frequent blood pressure checks  Approach: midline  Location: L3-4  Injection technique: single shot  CSF Fluid: clear free-flowing CSF  Needle  Needle type: pencil-tip   Needle gauge: 24 G  Needle length: 4 in  Additional Documentation: incremental injection, negative aspiration for heme and no paresthesia on injection  Needle localization: anatomical landmarks  Assessment  Sensory level: L1  Ease of block: easy  Patient's tolerance of the procedure: comfortable throughout block and no complaints

## 2020-03-06 NOTE — ANESTHESIA PROCEDURE NOTES
Peripheral Block    Patient location during procedure: OR   Block not for primary anesthetic.  Reason for block: at surgeon's request and post-op pain management   Post-op Pain Location: left femur fx  Start time: 3/6/2020 11:48 AM  Timeout: 3/6/2020 11:48 AM   End time: 3/6/2020 11:49 AM    Staffing  Authorizing Provider: Emre Saravia MD  Performing Provider: Dejon Vallecillo MD    Preanesthetic Checklist  Completed: patient identified, site marked, surgical consent, pre-op evaluation, timeout performed, IV checked, risks and benefits discussed and monitors and equipment checked  Peripheral Block  Patient position: supine  Prep: ChloraPrep  Patient monitoring: heart rate, cardiac monitor, continuous pulse ox, continuous capnometry and frequent blood pressure checks  Block type: adductor canal  Laterality: left  Injection technique: single shot  Needle  Needle type: Stimuplex   Needle gauge: 21 G  Needle length: 4 in  Needle localization: anatomical landmarks and ultrasound guidance   -ultrasound image captured on disc.  Assessment  Injection assessment: negative aspiration, negative parasthesia and local visualized surrounding nerve  Paresthesia pain: none  Heart rate change: no  Slow fractionated injection: yes  Additional Notes  VSS.  [facility] RN monitoring vitals throughout procedure.  Patient tolerated procedure well.

## 2020-03-06 NOTE — ANESTHESIA POSTPROCEDURE EVALUATION
Anesthesia Post Evaluation    Patient: Jonatan Reina    Procedure(s) Performed: Procedure(s) (LRB):  ORIF, FRACTURE, FEMUR DISTAL (Left)    Final Anesthesia Type: spinal    Patient location during evaluation: PACU  Patient participation: Yes- Able to Participate  Level of consciousness: awake and alert  Post-procedure vital signs: reviewed and stable  Pain management: adequate  Airway patency: patent    PONV status at discharge: No PONV  Anesthetic complications: no      Cardiovascular status: blood pressure returned to baseline  Respiratory status: unassisted  Hydration status: euvolemic  Follow-up not needed.          Vitals Value Taken Time   /80 3/6/2020  4:35 PM   Temp 37.1 °C (98.8 °F) 3/6/2020  4:30 PM   Pulse 86 3/6/2020  4:35 PM   Resp 18 3/6/2020  4:35 PM   SpO2 100 % 3/6/2020  4:35 PM         Event Time     Out of Recovery 16:32:00          Pain/Manisha Score: Manisha Score: 10 (3/6/2020  4:40 PM)

## 2020-03-06 NOTE — INTERVAL H&P NOTE
The patient has been examined and the H&P has been reviewed:    I concur with the findings and no changes have occurred since H&P was written.    Anesthesia/Surgery risks, benefits and alternative options discussed and understood by patient/family.          Active Hospital Problems    Diagnosis  POA    Closed comminuted intra-articular fracture of distal end of left femur [S72.492A]  Yes      Resolved Hospital Problems   No resolved problems to display.

## 2020-03-08 NOTE — OP NOTE
Operative Report    JONATAN FLOERS  : 1990  MRN: [id number]    Date of Procedure: 3/6/2020     Pre-op Diagnosis:    Closed comminuted intra-articular fracture of distal femur, left, initial encounter [S72.492A]    Post-op Diagnosis:    Post-Op Diagnosis Codes:     * Closed comminuted intra-articular fracture of distal femur, left, initial encounter [S72.492A]    Procedure:   Procedure(s) (LRB):  ORIF, FRACTURE, FEMUR DISTAL (Left)     Surgeon: Garry Sosa IV, MD     Assisting Surgeon:    none     Anesthesiologist:  See report    Anesthesia:    regional and epidural    Estimated Blood Loss: 150 mL         Specimens: none    Indications for surgery:   Jonatan Flores is a 29 y.o. male patient who was seen in the clinic complaining of pain, swelling and deformity after falling.  After being initially evaluated in an emergency department and discharged home, he presented to my clinic with a distal femur fracture.  The risks and benefits of nonoperative versus operative treatment were discussed.  The risks of surgery including, but not limited to, infection, bleeding, nerve injury, vessel injury, and fracture complications including nonunion, malunion, and need for further surgery.  We also discussed the possibility of post traumatic arthritis and failure to return his knee to his prior level of function as well as persistent knee pain. Understanding the risks, the patient elected to proceed with surgery.    Description of procedure:   The patient was taken to the operating room and placed on the operative table in the supine position.  After anesthesia was administered, a well padded thigh tourniquet was placed.  An appropriate time out was performed and the patient was given preoperative antibiotics.      The incision for a medial subvastus approach to the distal femur was marked then sharply incised.  Electrocautery was used to maintain hemostasis.  The fascia overlying the vastus medialis and  the sartorius was encountered.  The fascia at the medial edge of the vastus medialis was incised and the muscle was elevated superiorly off the intermuscular septum, taking care not to perforate the septum or migrate into the adductor canal.  The muscle was retracted and perforators to the vastus muscle were ligated with silk suture as needed.  Once the vastus was elevated, the knee capsule was encountered distally.  A capsulotomy was made at the medial knee and hematoma was expressed.  The patella was retracted laterally and the large obliquely oriented intraarticular split in the distal femur was identified passing through the intercondylar notch.  There was also a separate fracture of the anterior femoral shaft just proximal to the metaphyseal flare; this fracture was a rhomboid shaped piece of cortical bone which was completely separate from the intact underlying cancellus bone and proximal to the level where the oblique distal femur fracture exited along the medial distal femur.      A lamina  was used to irrigate and debride the intercondylar distal femur fracture.  Provisional fixation was achieved with a combination of large periarticular reduction forceps and K-wires.  Reduction was achieved visually and noted to be anatomic.  This was confirmed via fluoroscopy.  Two cannulated screws were placed from medial to lateral across the intercondylar fracture, taking care to avoid penetration of the articular surface.  Two small fragment screws were used to affix the cortical shell fracture fragment to the anterior cortex in its native position.  Having achieved anatomic reduction of the fracture, a large fragment T shaped plate was subsequently placed to buttress and reinforce the fracture fixation construct.      The tourniquet was deflated to inspect for excessive bleeding in the medial thigh.  There was none, and thus closure commenced using a #1 vicryl suture for the fascia and deep subcutaneous  tissue.  Prior to closure of the knee capsule, a medium hemovac drain was placed and exited through the lateral knee to avoid hemarthrosis.  The remainder of the incision was closed with 2.0 vicryl then a running 3.0 nylon suture.  Dry sterile dressing was then applied as well as a hinged knee brace locked in extension.       Post-Operative Management:  The patient will be NWB on the operative extremity.  After discharge, the patient will follow up in my office ([phone]) in 10 days after surgery.  The patient was instructed to remove the drain at POD 2.      Complications: No     Condition: Good     Disposition: PACU - hemodynamically stable.     Attestation: I was present and scrubbed for the entire procedure.    Implants:   Implant Name Type Inv. Item Serial No.  Lot No. LRB No. Used   SCREW BONE FCO 16MM 6.5X70MM - [id number]  SCREW BONE FCO 16MM 6.5X70MM  SYNTHES . Left 1   SCREW FCO 16MM THRD 6.5X75 SS - [id number]  SCREW FCO 16MM THRD 6.5X75 SS  SY[employer] . Left 1   SCREW CORTEX 4.5 40M - [id number]  SCREW CORTEX 4.5 40M  SYNTHES . Left 1   SCREW CORTEX 4.5 44M - [id number]  SCREW CORTEX 4.5 44M  SYNTHES . Left 1   SCREW CORTEX 4.5 46M - [id number]  SCREW CORTEX 4.5 46M  SYNTHES . Left 1   SCREW CORTEX 4.5 48M - [id number]  SCREW CORTEX 4.5 48M  SYNTHES . Left 1   SCREW 6.5MM CANC 65MM 16T - [id number]  SCREW 6.5MM CANC 65MM 16T  SYNTHES  Left 1   plate    SYNTHES . Left 1

## 2020-03-14 ENCOUNTER — HOSPITAL ENCOUNTER (EMERGENCY)
Facility: HOSPITAL | Age: 30
Discharge: HOME | End: 2020-03-14
Attending: EMERGENCY MEDICINE
Payer: MEDICAID

## 2020-03-14 VITALS
OXYGEN SATURATION: 99 % | WEIGHT: 190 LBS | RESPIRATION RATE: 16 BRPM | HEART RATE: 80 BPM | HEIGHT: 70 IN | TEMPERATURE: 98 F | BODY MASS INDEX: 27.2 KG/M2 | DIASTOLIC BLOOD PRESSURE: 58 MMHG | SYSTOLIC BLOOD PRESSURE: 108 MMHG

## 2020-03-14 DIAGNOSIS — Z48.89 ENCOUNTER FOR POST SURGICAL WOUND CHECK: Primary | ICD-10-CM

## 2020-03-14 PROCEDURE — 99281 EMR DPT VST MAYX REQ PHY/QHP: CPT

## 2020-03-15 NOTE — ED NOTES
Pt presents stating he needs dressing change to LLE. Pt had ORIF 8 days ago.  Patient identifiers  verified by spelling and stated name on armband along with .   APPEARANCE: Alert, oriented and in no acute distress.  CARDIAC: Normal rate and rhythm, no murmur heard.   PERIPHERAL VASCULAR: peripheral pulses present. Normal cap refill. No edema. Warm to touch.    RESPIRATORY:Normal rate and effort, breath sounds clear bilaterally throughout chest. Respirations are equal and unlabored no obvious signs of distress.  GASTRO: soft, bowel sounds normal, no tenderness, no abdominal distention.  MUSC: BUE and RLE with good ROM. Knee immobilizer to LLE from ORIF.                                                                                                                                                            SKIN: Skin is warm and dry, normal skin turgor, mucous membranes moist. Pt has healing incision to left upper extremity from ORIF.  NEURO: 5/5 strength major flexors/extensors bilaterally. Sensory intact to light touch bilaterally. Ana coma scale: eyes open spontaneously-4, oriented & converses-5, obeys commands-6. No neurological abnormalities.   MENTAL STATUS: awake, alert and aware of environment.  EYE: PERRL, both eyes: pupils brisk and reactive to light. Normal size.  Patient verbalized understanding of status and plan of care. Patient changed into hospital gown with assistance.  Patient side rails are up x 2, bed is low and locked, call light is in reach.

## 2020-03-15 NOTE — ED PROVIDER NOTES
Encounter Date: 3/14/2020    SCRIBE #1 NOTE: I, Mariana Cain, am scribing for, and in the presence of,  Dr. Griffin. I have scribed the entire note.       History     Chief Complaint   Patient presents with    Wound Check     Had surgery to left knee a week ago.         Time seen by provider: 11:16 PM on 03/14/2020    The patient is a 29 y.o. male who presents to the ED with complaint of wound check. He reports onset of symptoms was a few hours ago. The patient was getting out of bed when he felt that the wrapping and brace shifted with his movement. He would like to have the wrappings fixed or replaced to the area. The patient denies any other complaints including fever, chills, drainage from the wound, nausea or vomiting but admits to the left leg pain associated with surgery. Per medical record the patient had surgery to his left knee with Dr. Sosa on 3/6 for a femur fracture.     The history is provided by the patient.     Review of patient's allergies indicates:  No Known Allergies  No past medical history on file.  Past Surgical History:   Procedure Laterality Date    arm surgery      CERVICAL SPINE SURGERY      ORIF FEMUR FRACTURE Left 3/6/2020    Procedure: ORIF, FRACTURE, FEMUR DISTAL;  Surgeon: Garry Sosa IV, MD;  Location: [facility] OR;  Service: Orthopedics;  Laterality: Left;    Correct card and give to Molina    TYMPANOSTOMY TUBE PLACEMENT       No family history on file.  Social History     Tobacco Use    Smoking status: Never Smoker   Substance Use Topics    Alcohol use: Yes     Comment: very rare    Drug use: Yes     Types: Marijuana     Review of Systems   Constitutional: Negative for chills and fever.   Gastrointestinal: Negative for nausea and vomiting.   Musculoskeletal:        +left leg pain   Skin: Positive for wound (left leg).   All other systems reviewed and are negative.      Physical Exam     Initial Vitals [03/14/20 2215]   BP Pulse Resp Temp SpO2   (!) 110/56 81 18  98.4 °F (36.9 °C) 98 %      MAP       --         Physical Exam    Nursing note and vitals reviewed.  Constitutional: He appears well-developed and well-nourished. He is not diaphoretic. No distress.   HENT:   Head: Normocephalic and atraumatic.   Mouth/Throat: Oropharynx is clear and moist.   Eyes: Conjunctivae and EOM are normal.   Neck: Normal range of motion. Neck supple.   Cardiovascular: Intact distal pulses.   Pulmonary/Chest: No respiratory distress.   Musculoskeletal: Normal range of motion. He exhibits no edema or tenderness.   LLE: Well healing post surgical wound to left thigh and knee. No signs of wound dehiscence. No cellulitic changes. No erythema or tenderness of the calf.    Lymphadenopathy:     He has no cervical adenopathy.   Neurological: He is alert and oriented to person, place, and time. He has normal strength.   Skin: Skin is warm and dry. No rash noted.         ED Course   Procedures  Labs Reviewed - No data to display       Imaging Results    None          Medical Decision Making:   Initial Assessment:   29-year-old male, no history, presents the ER for evaluation wound check, bandaging cast readjustment.  Had recent ORIF surgery done by Dr. Sosa, with removal of hardware.  Reports that he felt that his leg brace and bandage have shifted causing discomfort.  No fever, chills, chest pain, shortness of breath, leg swelling.  He is essentially here for wound check and adjustment of his brace.  Well healing postsurgical wounds noted without any sign of wound dehiscence, purulent discharge or cellulitic changes.  No calf tenderness or erythema.  Discussed with patient, will change bandages and readjust knee brace and will plan discharge.  Patient understood agreed plan.              Scribe Attestation:   Scribe #1: I performed the above scribed service and the documentation accurately describes the services I performed. I attest to the accuracy of the note.    Attending Attestation:            Physician Attestation for Scribe:  Physician Attestation Statement for Scribe #1: I, Dr. Griffin, reviewed documentation, as scribed by Mariana Cain in my presence, and it is both accurate and complete.                               Clinical Impression:     1. Encounter for post surgical wound check         Disposition:   Disposition: Discharged  Condition: Stable     ED Disposition Condition    Discharge Stable        ED Prescriptions     None        Follow-up Information     Follow up With Specialties Details Why Contact Info    Garry Sosa IV, MD Orthopedic Surgery Schedule an appointment as soon as possible for a visit  As needed 1 [facility]  [address] [place] [address] [id number] [phone]                                       Madison Griffin MD  03/15/20 0039

## 2020-03-31 ENCOUNTER — HOSPITAL ENCOUNTER (INPATIENT)
Facility: HOSPITAL | Age: 30
LOS: 2 days | Discharge: HOME | DRG: 464 | End: 2020-04-02
Attending: EMERGENCY MEDICINE | Admitting: ORTHOPAEDIC SURGERY
Payer: MEDICAID
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
## 2020-03-31 DIAGNOSIS — Z01.818 PREOP EXAMINATION: ICD-10-CM

## 2020-03-31 DIAGNOSIS — Z01.811 PRE-OP CHEST EXAM: ICD-10-CM

## 2020-03-31 DIAGNOSIS — T14.8XXA FRACTURE: Primary | ICD-10-CM

## 2020-03-31 DIAGNOSIS — S72.322C: ICD-10-CM

## 2020-03-31 PROBLEM — S72.302A: Status: ACTIVE | Noted: 2020-03-31

## 2020-03-31 LAB
ABO + RH BLD: NORMAL
ALBUMIN SERPL BCP-MCNC: 4.3 G/DL (ref 3.5–5.2)
ALP SERPL-CCNC: 90 U/L (ref 55–135)
ALT SERPL W/O P-5'-P-CCNC: 13 U/L (ref 10–44)
ANION GAP SERPL CALC-SCNC: 11 MMOL/L (ref 8–16)
APTT BLDCRRT: 24.1 SEC (ref 21–32)
AST SERPL-CCNC: 19 U/L (ref 10–40)
BASOPHILS # BLD AUTO: 0.03 K/UL (ref 0–0.2)
BASOPHILS NFR BLD: 0.5 % (ref 0–1.9)
BILIRUB SERPL-MCNC: 0.3 MG/DL (ref 0.1–1)
BLD GP AB SCN CELLS X3 SERPL QL: NORMAL
BUN SERPL-MCNC: 11 MG/DL (ref 6–20)
CALCIUM SERPL-MCNC: 9.3 MG/DL (ref 8.7–10.5)
CHLORIDE SERPL-SCNC: 103 MMOL/L (ref 95–110)
CO2 SERPL-SCNC: 27 MMOL/L (ref 23–29)
CREAT SERPL-MCNC: 1.1 MG/DL (ref 0.5–1.4)
DIFFERENTIAL METHOD: ABNORMAL
EOSINOPHIL # BLD AUTO: 0.1 K/UL (ref 0–0.5)
EOSINOPHIL NFR BLD: 2.1 % (ref 0–8)
ERYTHROCYTE [DISTWIDTH] IN BLOOD BY AUTOMATED COUNT: 12.5 % (ref 11.5–14.5)
EST. GFR  (AFRICAN AMERICAN): >60 ML/MIN/1.73 M^2
EST. GFR  (NON AFRICAN AMERICAN): >60 ML/MIN/1.73 M^2
GLUCOSE SERPL-MCNC: 104 MG/DL (ref 70–110)
HCT VFR BLD AUTO: 37.4 % (ref 40–54)
HGB BLD-MCNC: 12.2 G/DL (ref 14–18)
IMM GRANULOCYTES # BLD AUTO: 0.02 K/UL (ref 0–0.04)
IMM GRANULOCYTES NFR BLD AUTO: 0.3 % (ref 0–0.5)
INR PPP: 1 (ref 0.8–1.2)
LYMPHOCYTES # BLD AUTO: 2.8 K/UL (ref 1–4.8)
LYMPHOCYTES NFR BLD: 42.3 % (ref 18–48)
MCH RBC QN AUTO: 31 PG (ref 27–31)
MCHC RBC AUTO-ENTMCNC: 32.6 G/DL (ref 32–36)
MCV RBC AUTO: 95 FL (ref 82–98)
MONOCYTES # BLD AUTO: 0.6 K/UL (ref 0.3–1)
MONOCYTES NFR BLD: 8.9 % (ref 4–15)
NEUTROPHILS # BLD AUTO: 3 K/UL (ref 1.8–7.7)
NEUTROPHILS NFR BLD: 45.9 % (ref 38–73)
NRBC BLD-RTO: 0 /100 WBC
PLATELET # BLD AUTO: 226 K/UL (ref 150–350)
PMV BLD AUTO: 10.1 FL (ref 9.2–12.9)
POTASSIUM SERPL-SCNC: 3.8 MMOL/L (ref 3.5–5.1)
PROT SERPL-MCNC: 7.4 G/DL (ref 6–8.4)
PROTHROMBIN TIME: 10.3 SEC (ref 9–12.5)
RBC # BLD AUTO: 3.94 M/UL (ref 4.6–6.2)
SODIUM SERPL-SCNC: 141 MMOL/L (ref 136–145)
WBC # BLD AUTO: 6.55 K/UL (ref 3.9–12.7)

## 2020-03-31 PROCEDURE — 25000003 PHARM REV CODE 250: Performed by: STUDENT IN AN ORGANIZED HEALTH CARE EDUCATION/TRAINING PROGRAM

## 2020-03-31 PROCEDURE — 85025 COMPLETE CBC W/AUTO DIFF WBC: CPT

## 2020-03-31 PROCEDURE — 12000002 HC ACUTE/MED SURGE SEMI-PRIVATE ROOM

## 2020-03-31 PROCEDURE — 99285 EMERGENCY DEPT VISIT HI MDM: CPT | Mod: 25

## 2020-03-31 PROCEDURE — 63600175 PHARM REV CODE 636 W HCPCS: Performed by: EMERGENCY MEDICINE

## 2020-03-31 PROCEDURE — 80053 COMPREHEN METABOLIC PANEL: CPT

## 2020-03-31 PROCEDURE — 63600175 PHARM REV CODE 636 W HCPCS: Performed by: STUDENT IN AN ORGANIZED HEALTH CARE EDUCATION/TRAINING PROGRAM

## 2020-03-31 PROCEDURE — 96375 TX/PRO/DX INJ NEW DRUG ADDON: CPT

## 2020-03-31 PROCEDURE — 85730 THROMBOPLASTIN TIME PARTIAL: CPT

## 2020-03-31 PROCEDURE — 86900 BLOOD TYPING SEROLOGIC ABO: CPT

## 2020-03-31 PROCEDURE — 96376 TX/PRO/DX INJ SAME DRUG ADON: CPT

## 2020-03-31 PROCEDURE — 90715 TDAP VACCINE 7 YRS/> IM: CPT | Performed by: STUDENT IN AN ORGANIZED HEALTH CARE EDUCATION/TRAINING PROGRAM

## 2020-03-31 PROCEDURE — 93010 ELECTROCARDIOGRAM REPORT: CPT | Mod: ,,, | Performed by: INTERNAL MEDICINE

## 2020-03-31 PROCEDURE — 93005 ELECTROCARDIOGRAM TRACING: CPT

## 2020-03-31 PROCEDURE — 85610 PROTHROMBIN TIME: CPT

## 2020-03-31 PROCEDURE — 90471 IMMUNIZATION ADMIN: CPT | Performed by: STUDENT IN AN ORGANIZED HEALTH CARE EDUCATION/TRAINING PROGRAM

## 2020-03-31 PROCEDURE — 96365 THER/PROPH/DIAG IV INF INIT: CPT

## 2020-03-31 PROCEDURE — 99285 PR EMERGENCY DEPT VISIT,LEVEL V: ICD-10-PCS | Mod: ,,, | Performed by: EMERGENCY MEDICINE

## 2020-03-31 PROCEDURE — 93010 EKG 12-LEAD: ICD-10-PCS | Mod: ,,, | Performed by: INTERNAL MEDICINE

## 2020-03-31 PROCEDURE — 99285 EMERGENCY DEPT VISIT HI MDM: CPT | Mod: ,,, | Performed by: EMERGENCY MEDICINE

## 2020-03-31 RX ORDER — METHOCARBAMOL 500 MG/1
500 TABLET, FILM COATED ORAL ONCE
Status: COMPLETED | OUTPATIENT
Start: 2020-03-31 | End: 2020-03-31

## 2020-03-31 RX ORDER — HYDROMORPHONE HYDROCHLORIDE 1 MG/ML
1 INJECTION, SOLUTION INTRAMUSCULAR; INTRAVENOUS; SUBCUTANEOUS
Status: COMPLETED | OUTPATIENT
Start: 2020-03-31 | End: 2020-03-31

## 2020-03-31 RX ORDER — ONDANSETRON 2 MG/ML
4 INJECTION INTRAMUSCULAR; INTRAVENOUS
Status: COMPLETED | OUTPATIENT
Start: 2020-03-31 | End: 2020-03-31

## 2020-03-31 RX ORDER — ONDANSETRON 2 MG/ML
INJECTION INTRAMUSCULAR; INTRAVENOUS
Status: DISPENSED
Start: 2020-03-31 | End: 2020-04-01

## 2020-03-31 RX ORDER — VANCOMYCIN 2 GRAM/500 ML IN 0.9 % SODIUM CHLORIDE INTRAVENOUS
2000 ONCE
Status: COMPLETED | OUTPATIENT
Start: 2020-04-01 | End: 2020-04-01

## 2020-03-31 RX ORDER — CEFAZOLIN SODIUM 1 G/3ML
2 INJECTION, POWDER, FOR SOLUTION INTRAMUSCULAR; INTRAVENOUS
Status: COMPLETED | OUTPATIENT
Start: 2020-03-31 | End: 2020-03-31

## 2020-03-31 RX ORDER — ONDANSETRON 2 MG/ML
4 INJECTION INTRAMUSCULAR; INTRAVENOUS
Status: ACTIVE | OUTPATIENT
Start: 2020-03-31 | End: 2020-04-01

## 2020-03-31 RX ORDER — ACETAMINOPHEN 500 MG
500 TABLET ORAL EVERY 6 HOURS
Status: DISPENSED | OUTPATIENT
Start: 2020-03-31 | End: 2020-04-01

## 2020-03-31 RX ORDER — ONDANSETRON 4 MG/1
4 TABLET, ORALLY DISINTEGRATING ORAL ONCE
Status: DISCONTINUED | OUTPATIENT
Start: 2020-03-31 | End: 2020-03-31

## 2020-03-31 RX ORDER — MORPHINE SULFATE 4 MG/ML
8 INJECTION, SOLUTION INTRAMUSCULAR; INTRAVENOUS
Status: COMPLETED | OUTPATIENT
Start: 2020-03-31 | End: 2020-03-31

## 2020-03-31 RX ORDER — MORPHINE SULFATE 2 MG/ML
6 INJECTION, SOLUTION INTRAMUSCULAR; INTRAVENOUS ONCE
Status: DISCONTINUED | OUTPATIENT
Start: 2020-04-01 | End: 2020-03-31

## 2020-03-31 RX ADMIN — CLOSTRIDIUM TETANI TOXOID ANTIGEN (FORMALDEHYDE INACTIVATED), CORYNEBACTERIUM DIPHTHERIAE TOXOID ANTIGEN (FORMALDEHYDE INACTIVATED), BORDETELLA PERTUSSIS TOXOID ANTIGEN (GLUTARALDEHYDE INACTIVATED), BORDETELLA PERTUSSIS FILAMENTOUS HEMAGGLUTININ ANTIGEN (FORMALDEHYDE INACTIVATED), BORDETELLA PERTUSSIS PERTACTIN ANTIGEN, AND BORDETELLA PERTUSSIS FIMBRIAE 2/3 ANTIGEN 0.5 ML: 5; 2; 2.5; 5; 3; 5 INJECTION, SUSPENSION INTRAMUSCULAR at 09:03

## 2020-03-31 RX ADMIN — VANCOMYCIN HYDROCHLORIDE 2000 MG: 100 INJECTION, POWDER, LYOPHILIZED, FOR SOLUTION INTRAVENOUS at 11:03

## 2020-03-31 RX ADMIN — HYDROMORPHONE HYDROCHLORIDE 1 MG: 1 INJECTION, SOLUTION INTRAMUSCULAR; INTRAVENOUS; SUBCUTANEOUS at 09:03

## 2020-03-31 RX ADMIN — HYDROMORPHONE HYDROCHLORIDE 1 MG: 1 INJECTION, SOLUTION INTRAMUSCULAR; INTRAVENOUS; SUBCUTANEOUS at 10:03

## 2020-03-31 RX ADMIN — MORPHINE SULFATE 8 MG: 4 INJECTION, SOLUTION INTRAMUSCULAR; INTRAVENOUS at 08:03

## 2020-03-31 RX ADMIN — CEFAZOLIN 2 G: 1 INJECTION, POWDER, FOR SOLUTION INTRAMUSCULAR; INTRAVENOUS at 09:03

## 2020-03-31 RX ADMIN — ONDANSETRON 4 MG: 2 INJECTION, SOLUTION INTRAMUSCULAR; INTRAVENOUS at 08:03

## 2020-03-31 RX ADMIN — METHOCARBAMOL TABLETS 500 MG: 500 TABLET, COATED ORAL at 10:03

## 2020-04-01 ENCOUNTER — TELEPHONE (OUTPATIENT)
Dept: PHARMACY | Facility: CLINIC | Age: 30
End: 2020-04-01

## 2020-04-01 ENCOUNTER — ANESTHESIA EVENT (OUTPATIENT)
Dept: SURGERY | Facility: HOSPITAL | Age: 30
DRG: 464 | End: 2020-04-01
Payer: MEDICAID

## 2020-04-01 ENCOUNTER — ANESTHESIA (OUTPATIENT)
Dept: SURGERY | Facility: HOSPITAL | Age: 30
DRG: 464 | End: 2020-04-01
Payer: MEDICAID

## 2020-04-01 ENCOUNTER — TELEMEDICINE (OUTPATIENT)
Dept: FAMILY MEDICINE | Facility: HOSPITAL | Age: 30
End: 2020-04-01

## 2020-04-01 PROBLEM — T81.31XA SURGICAL WOUND DEHISCENCE: Status: ACTIVE | Noted: 2020-04-01

## 2020-04-01 PROBLEM — S81.009A OPEN KNEE WOUND: Status: ACTIVE | Noted: 2020-04-01

## 2020-04-01 LAB
ABO + RH BLD: NORMAL
BASOPHILS # BLD AUTO: 0.01 K/UL (ref 0–0.2)
BASOPHILS NFR BLD: 0.1 % (ref 0–1.9)
BLD GP AB SCN CELLS X3 SERPL QL: NORMAL
BLOOD GROUP ANTIBODIES SERPL: NORMAL
DIFFERENTIAL METHOD: ABNORMAL
EOSINOPHIL # BLD AUTO: 0 K/UL (ref 0–0.5)
EOSINOPHIL NFR BLD: 0 % (ref 0–8)
ERYTHROCYTE [DISTWIDTH] IN BLOOD BY AUTOMATED COUNT: 12.7 % (ref 11.5–14.5)
HCT VFR BLD AUTO: 30.2 % (ref 40–54)
HGB BLD-MCNC: 9.5 G/DL (ref 14–18)
IMM GRANULOCYTES # BLD AUTO: 0.04 K/UL (ref 0–0.04)
IMM GRANULOCYTES NFR BLD AUTO: 0.4 % (ref 0–0.5)
LYMPHOCYTES # BLD AUTO: 0.6 K/UL (ref 1–4.8)
LYMPHOCYTES NFR BLD: 6.3 % (ref 18–48)
MCH RBC QN AUTO: 30 PG (ref 27–31)
MCHC RBC AUTO-ENTMCNC: 31.5 G/DL (ref 32–36)
MCV RBC AUTO: 95 FL (ref 82–98)
MONOCYTES # BLD AUTO: 0.3 K/UL (ref 0.3–1)
MONOCYTES NFR BLD: 3.6 % (ref 4–15)
NEUTROPHILS # BLD AUTO: 8.2 K/UL (ref 1.8–7.7)
NEUTROPHILS NFR BLD: 89.6 % (ref 38–73)
NRBC BLD-RTO: 0 /100 WBC
PLATELET # BLD AUTO: 193 K/UL (ref 150–350)
PMV BLD AUTO: 10 FL (ref 9.2–12.9)
RBC # BLD AUTO: 3.17 M/UL (ref 4.6–6.2)
WBC # BLD AUTO: 9.14 K/UL (ref 3.9–12.7)

## 2020-04-01 PROCEDURE — 27310 EXPLORATION OF KNEE JOINT: CPT | Mod: 51,LT,, | Performed by: ORTHOPAEDIC SURGERY

## 2020-04-01 PROCEDURE — 37000009 HC ANESTHESIA EA ADD 15 MINS: Performed by: ORTHOPAEDIC SURGERY

## 2020-04-01 PROCEDURE — 71000016 HC POSTOP RECOV ADDL HR: Performed by: ORTHOPAEDIC SURGERY

## 2020-04-01 PROCEDURE — C1713 ANCHOR/SCREW BN/BN,TIS/BN: HCPCS | Performed by: ORTHOPAEDIC SURGERY

## 2020-04-01 PROCEDURE — 63600175 PHARM REV CODE 636 W HCPCS: Performed by: NURSE ANESTHETIST, CERTIFIED REGISTERED

## 2020-04-01 PROCEDURE — 93010 ELECTROCARDIOGRAM REPORT: CPT | Mod: ,,, | Performed by: INTERNAL MEDICINE

## 2020-04-01 PROCEDURE — D9220A PRA ANESTHESIA: ICD-10-PCS | Mod: ANES,,, | Performed by: ANESTHESIOLOGY

## 2020-04-01 PROCEDURE — 94761 N-INVAS EAR/PLS OXIMETRY MLT: CPT

## 2020-04-01 PROCEDURE — 27100025 HC TUBING, SET FLUID WARMER: Performed by: ANESTHESIOLOGY

## 2020-04-01 PROCEDURE — 63600175 PHARM REV CODE 636 W HCPCS: Performed by: ORTHOPAEDIC SURGERY

## 2020-04-01 PROCEDURE — 71000033 HC RECOVERY, INTIAL HOUR: Performed by: ORTHOPAEDIC SURGERY

## 2020-04-01 PROCEDURE — 25000003 PHARM REV CODE 250: Performed by: NURSE ANESTHETIST, CERTIFIED REGISTERED

## 2020-04-01 PROCEDURE — 25000003 PHARM REV CODE 250: Performed by: STUDENT IN AN ORGANIZED HEALTH CARE EDUCATION/TRAINING PROGRAM

## 2020-04-01 PROCEDURE — C1769 GUIDE WIRE: HCPCS | Performed by: ORTHOPAEDIC SURGERY

## 2020-04-01 PROCEDURE — 27310 PR EXPLOR/DRAIN KNEE,INFECTN: ICD-10-PCS | Mod: 51,LT,, | Performed by: ORTHOPAEDIC SURGERY

## 2020-04-01 PROCEDURE — 93005 ELECTROCARDIOGRAM TRACING: CPT

## 2020-04-01 PROCEDURE — 37000008 HC ANESTHESIA 1ST 15 MINUTES: Performed by: ORTHOPAEDIC SURGERY

## 2020-04-01 PROCEDURE — 85025 COMPLETE CBC W/AUTO DIFF WBC: CPT

## 2020-04-01 PROCEDURE — 36000710: Performed by: ORTHOPAEDIC SURGERY

## 2020-04-01 PROCEDURE — 63600175 PHARM REV CODE 636 W HCPCS: Performed by: STUDENT IN AN ORGANIZED HEALTH CARE EDUCATION/TRAINING PROGRAM

## 2020-04-01 PROCEDURE — 27201423 OPTIME MED/SURG SUP & DEVICES STERILE SUPPLY: Performed by: ORTHOPAEDIC SURGERY

## 2020-04-01 PROCEDURE — D9220A PRA ANESTHESIA: Mod: ANES,,, | Performed by: ANESTHESIOLOGY

## 2020-04-01 PROCEDURE — 63600175 PHARM REV CODE 636 W HCPCS: Performed by: ANESTHESIOLOGY

## 2020-04-01 PROCEDURE — 15738 PR MUSCLE-SKIN FLAP,LEG: ICD-10-PCS | Mod: LT,,, | Performed by: ORTHOPAEDIC SURGERY

## 2020-04-01 PROCEDURE — 86870 RBC ANTIBODY IDENTIFICATION: CPT

## 2020-04-01 PROCEDURE — 36000711: Performed by: ORTHOPAEDIC SURGERY

## 2020-04-01 PROCEDURE — D9220A PRA ANESTHESIA: ICD-10-PCS | Mod: CRNA,,, | Performed by: NURSE ANESTHETIST, CERTIFIED REGISTERED

## 2020-04-01 PROCEDURE — 15738 MUSCLE-SKIN GRAFT LEG: CPT | Mod: LT,,, | Performed by: ORTHOPAEDIC SURGERY

## 2020-04-01 PROCEDURE — 93010 EKG 12-LEAD: ICD-10-PCS | Mod: ,,, | Performed by: INTERNAL MEDICINE

## 2020-04-01 PROCEDURE — 20600001 HC STEP DOWN PRIVATE ROOM

## 2020-04-01 PROCEDURE — 27507 PR OPEN RX FEMUR FX+PLATE/SCREW: ICD-10-PCS | Mod: 51,LT,, | Performed by: ORTHOPAEDIC SURGERY

## 2020-04-01 PROCEDURE — D9220A PRA ANESTHESIA: Mod: CRNA,,, | Performed by: NURSE ANESTHETIST, CERTIFIED REGISTERED

## 2020-04-01 PROCEDURE — 27507 TREATMENT OF THIGH FRACTURE: CPT | Mod: 51,LT,, | Performed by: ORTHOPAEDIC SURGERY

## 2020-04-01 PROCEDURE — 71000015 HC POSTOP RECOV 1ST HR: Performed by: ORTHOPAEDIC SURGERY

## 2020-04-01 PROCEDURE — 86901 BLOOD TYPING SEROLOGIC RH(D): CPT

## 2020-04-01 DEVICE — SCREW CORTEX 4.5 36M: Type: IMPLANTABLE DEVICE | Site: FEMUR | Status: FUNCTIONAL

## 2020-04-01 DEVICE — CABLE 1.0 W/CRIMP: Type: IMPLANTABLE DEVICE | Site: FEMUR | Status: FUNCTIONAL

## 2020-04-01 DEVICE — SCREW CANN VA LOK 5X80MM: Type: IMPLANTABLE DEVICE | Site: FEMUR | Status: FUNCTIONAL

## 2020-04-01 DEVICE — IMPLANTABLE DEVICE: Type: IMPLANTABLE DEVICE | Site: FEMUR | Status: FUNCTIONAL

## 2020-04-01 DEVICE — SCREW CORTEX 4.5 38M: Type: IMPLANTABLE DEVICE | Site: FEMUR | Status: FUNCTIONAL

## 2020-04-01 DEVICE — SCREW STRDRV VA LOK 5X36MM: Type: IMPLANTABLE DEVICE | Site: FEMUR | Status: FUNCTIONAL

## 2020-04-01 DEVICE — SCREW CANN VA LOK 5X55MM: Type: IMPLANTABLE DEVICE | Site: FEMUR | Status: FUNCTIONAL

## 2020-04-01 RX ORDER — MORPHINE SULFATE 2 MG/ML
6 INJECTION, SOLUTION INTRAMUSCULAR; INTRAVENOUS
Status: DISCONTINUED | OUTPATIENT
Start: 2020-04-01 | End: 2020-04-01

## 2020-04-01 RX ORDER — OXYCODONE HYDROCHLORIDE 5 MG/1
5 TABLET ORAL EVERY 4 HOURS PRN
Qty: 40 TABLET | Refills: 0 | Status: SHIPPED | OUTPATIENT
Start: 2020-04-01 | End: 2020-04-08 | Stop reason: ALTCHOICE

## 2020-04-01 RX ORDER — CEFAZOLIN SODIUM 1 G/3ML
2 INJECTION, POWDER, FOR SOLUTION INTRAMUSCULAR; INTRAVENOUS
Status: DISCONTINUED | OUTPATIENT
Start: 2020-04-01 | End: 2020-04-01

## 2020-04-01 RX ORDER — CELECOXIB 200 MG/1
200 CAPSULE ORAL DAILY
Qty: 12 CAPSULE | Refills: 0 | Status: SHIPPED | OUTPATIENT
Start: 2020-04-01 | End: 2020-04-20 | Stop reason: SDUPTHER

## 2020-04-01 RX ORDER — CELECOXIB 100 MG/1
100 CAPSULE ORAL DAILY
Status: DISCONTINUED | OUTPATIENT
Start: 2020-04-01 | End: 2020-04-02 | Stop reason: HOSPADM

## 2020-04-01 RX ORDER — OXYCODONE HYDROCHLORIDE 5 MG/1
5 TABLET ORAL EVERY 4 HOURS PRN
Status: DISCONTINUED | OUTPATIENT
Start: 2020-04-01 | End: 2020-04-02

## 2020-04-01 RX ORDER — MORPHINE SULFATE 2 MG/ML
3 INJECTION, SOLUTION INTRAMUSCULAR; INTRAVENOUS
Status: DISCONTINUED | OUTPATIENT
Start: 2020-04-01 | End: 2020-04-02

## 2020-04-01 RX ORDER — VANCOMYCIN HYDROCHLORIDE 1 G/20ML
1000 INJECTION, POWDER, LYOPHILIZED, FOR SOLUTION INTRAVENOUS ONCE
Status: DISCONTINUED | OUTPATIENT
Start: 2020-04-01 | End: 2020-04-02 | Stop reason: HOSPADM

## 2020-04-01 RX ORDER — DEXTROSE MONOHYDRATE AND SODIUM CHLORIDE 5; .9 G/100ML; G/100ML
INJECTION, SOLUTION INTRAVENOUS CONTINUOUS
Status: DISCONTINUED | OUTPATIENT
Start: 2020-04-01 | End: 2020-04-02 | Stop reason: HOSPADM

## 2020-04-01 RX ORDER — CEFEPIME HYDROCHLORIDE 1 G/50ML
INJECTION, SOLUTION INTRAVENOUS CONTINUOUS PRN
Status: COMPLETED | OUTPATIENT
Start: 2020-04-01 | End: 2020-04-01

## 2020-04-01 RX ORDER — SODIUM CHLORIDE 9 MG/ML
INJECTION, SOLUTION INTRAVENOUS CONTINUOUS PRN
Status: DISCONTINUED | OUTPATIENT
Start: 2020-04-01 | End: 2020-04-01

## 2020-04-01 RX ORDER — HYDROMORPHONE HYDROCHLORIDE 1 MG/ML
0.2 INJECTION, SOLUTION INTRAMUSCULAR; INTRAVENOUS; SUBCUTANEOUS EVERY 5 MIN PRN
Status: DISCONTINUED | OUTPATIENT
Start: 2020-04-01 | End: 2020-04-01 | Stop reason: HOSPADM

## 2020-04-01 RX ORDER — KETOROLAC TROMETHAMINE 30 MG/ML
INJECTION, SOLUTION INTRAMUSCULAR; INTRAVENOUS
Status: DISCONTINUED | OUTPATIENT
Start: 2020-04-01 | End: 2020-04-01

## 2020-04-01 RX ORDER — ONDANSETRON 8 MG/1
8 TABLET, ORALLY DISINTEGRATING ORAL EVERY 8 HOURS PRN
Status: DISCONTINUED | OUTPATIENT
Start: 2020-04-01 | End: 2020-04-02 | Stop reason: HOSPADM

## 2020-04-01 RX ORDER — CEFAZOLIN SODIUM 1 G/3ML
2 INJECTION, POWDER, FOR SOLUTION INTRAMUSCULAR; INTRAVENOUS
Status: COMPLETED | OUTPATIENT
Start: 2020-04-01 | End: 2020-04-02

## 2020-04-01 RX ORDER — ENOXAPARIN SODIUM 100 MG/ML
40 INJECTION SUBCUTANEOUS DAILY
Qty: 12 ML | Refills: 0 | Status: SHIPPED | OUTPATIENT
Start: 2020-04-01 | End: 2020-05-02

## 2020-04-01 RX ORDER — LIDOCAINE HCL/PF 100 MG/5ML
SYRINGE (ML) INTRAVENOUS
Status: DISCONTINUED | OUTPATIENT
Start: 2020-04-01 | End: 2020-04-01

## 2020-04-01 RX ORDER — METHOCARBAMOL 500 MG/1
1000 TABLET, FILM COATED ORAL 3 TIMES DAILY
Qty: 84 TABLET | Refills: 0 | Status: SHIPPED | OUTPATIENT
Start: 2020-04-01 | End: 2020-04-16

## 2020-04-01 RX ORDER — OXYCODONE HYDROCHLORIDE 10 MG/1
10 TABLET ORAL EVERY 4 HOURS PRN
Status: DISCONTINUED | OUTPATIENT
Start: 2020-04-01 | End: 2020-04-02

## 2020-04-01 RX ORDER — KETAMINE HCL IN 0.9 % NACL 50 MG/5 ML
SYRINGE (ML) INTRAVENOUS
Status: DISCONTINUED | OUTPATIENT
Start: 2020-04-01 | End: 2020-04-01

## 2020-04-01 RX ORDER — TRANEXAMIC ACID 100 MG/ML
INJECTION, SOLUTION INTRAVENOUS
Status: DISCONTINUED | OUTPATIENT
Start: 2020-04-01 | End: 2020-04-01

## 2020-04-01 RX ORDER — MIDAZOLAM HYDROCHLORIDE 1 MG/ML
0.5 INJECTION INTRAMUSCULAR; INTRAVENOUS
Status: DISCONTINUED | OUTPATIENT
Start: 2020-04-01 | End: 2020-04-01 | Stop reason: HOSPADM

## 2020-04-01 RX ORDER — GABAPENTIN 100 MG/1
100 CAPSULE ORAL 3 TIMES DAILY
Status: DISCONTINUED | OUTPATIENT
Start: 2020-04-01 | End: 2020-04-02 | Stop reason: HOSPADM

## 2020-04-01 RX ORDER — ENOXAPARIN SODIUM 100 MG/ML
40 INJECTION SUBCUTANEOUS EVERY 24 HOURS
Status: DISCONTINUED | OUTPATIENT
Start: 2020-04-02 | End: 2020-04-02 | Stop reason: HOSPADM

## 2020-04-01 RX ORDER — SODIUM CHLORIDE 0.9 % (FLUSH) 0.9 %
10 SYRINGE (ML) INJECTION
Status: DISCONTINUED | OUTPATIENT
Start: 2020-04-01 | End: 2020-04-01 | Stop reason: HOSPADM

## 2020-04-01 RX ORDER — HALOPERIDOL 5 MG/ML
INJECTION INTRAMUSCULAR
Status: DISPENSED
Start: 2020-04-01 | End: 2020-04-02

## 2020-04-01 RX ORDER — HYDROMORPHONE HYDROCHLORIDE 1 MG/ML
INJECTION, SOLUTION INTRAMUSCULAR; INTRAVENOUS; SUBCUTANEOUS
Status: DISPENSED
Start: 2020-04-01 | End: 2020-04-01

## 2020-04-01 RX ORDER — MORPHINE SULFATE 2 MG/ML
2 INJECTION, SOLUTION INTRAMUSCULAR; INTRAVENOUS ONCE
Status: COMPLETED | OUTPATIENT
Start: 2020-04-01 | End: 2020-04-01

## 2020-04-01 RX ORDER — VANCOMYCIN HYDROCHLORIDE 1 G/20ML
INJECTION, POWDER, LYOPHILIZED, FOR SOLUTION INTRAVENOUS
Status: DISCONTINUED | OUTPATIENT
Start: 2020-04-01 | End: 2020-04-01 | Stop reason: HOSPADM

## 2020-04-01 RX ORDER — KETOROLAC TROMETHAMINE 30 MG/ML
15 INJECTION, SOLUTION INTRAMUSCULAR; INTRAVENOUS EVERY 8 HOURS PRN
Status: DISCONTINUED | OUTPATIENT
Start: 2020-04-01 | End: 2020-04-02

## 2020-04-01 RX ORDER — FENTANYL CITRATE 50 UG/ML
25 INJECTION, SOLUTION INTRAMUSCULAR; INTRAVENOUS EVERY 5 MIN PRN
Status: DISCONTINUED | OUTPATIENT
Start: 2020-04-01 | End: 2020-04-01 | Stop reason: HOSPADM

## 2020-04-01 RX ORDER — PHENYLEPHRINE HYDROCHLORIDE 10 MG/ML
INJECTION INTRAVENOUS
Status: DISCONTINUED | OUTPATIENT
Start: 2020-04-01 | End: 2020-04-01

## 2020-04-01 RX ORDER — VANCOMYCIN HCL IN 5 % DEXTROSE 1G/250ML
1000 PLASTIC BAG, INJECTION (ML) INTRAVENOUS
Status: COMPLETED | OUTPATIENT
Start: 2020-04-01 | End: 2020-04-01

## 2020-04-01 RX ORDER — SODIUM CHLORIDE 0.9 % (FLUSH) 0.9 %
10 SYRINGE (ML) INJECTION
Status: DISCONTINUED | OUTPATIENT
Start: 2020-04-01 | End: 2020-04-02 | Stop reason: HOSPADM

## 2020-04-01 RX ORDER — MIDAZOLAM HYDROCHLORIDE 1 MG/ML
INJECTION, SOLUTION INTRAMUSCULAR; INTRAVENOUS
Status: DISCONTINUED | OUTPATIENT
Start: 2020-04-01 | End: 2020-04-01

## 2020-04-01 RX ORDER — DEXAMETHASONE SODIUM PHOSPHATE 4 MG/ML
INJECTION, SOLUTION INTRA-ARTICULAR; INTRALESIONAL; INTRAMUSCULAR; INTRAVENOUS; SOFT TISSUE
Status: DISCONTINUED | OUTPATIENT
Start: 2020-04-01 | End: 2020-04-01

## 2020-04-01 RX ORDER — FENTANYL CITRATE 50 UG/ML
INJECTION, SOLUTION INTRAMUSCULAR; INTRAVENOUS
Status: DISCONTINUED | OUTPATIENT
Start: 2020-04-01 | End: 2020-04-01

## 2020-04-01 RX ORDER — METHOCARBAMOL 500 MG/1
500 TABLET, FILM COATED ORAL 4 TIMES DAILY
Status: DISCONTINUED | OUTPATIENT
Start: 2020-04-01 | End: 2020-04-02 | Stop reason: HOSPADM

## 2020-04-01 RX ORDER — SUCCINYLCHOLINE CHLORIDE 20 MG/ML
INJECTION INTRAMUSCULAR; INTRAVENOUS
Status: DISCONTINUED | OUTPATIENT
Start: 2020-04-01 | End: 2020-04-01

## 2020-04-01 RX ORDER — PROPOFOL 10 MG/ML
VIAL (ML) INTRAVENOUS
Status: DISCONTINUED | OUTPATIENT
Start: 2020-04-01 | End: 2020-04-01

## 2020-04-01 RX ORDER — HALOPERIDOL 5 MG/ML
0.5 INJECTION INTRAMUSCULAR ONCE
Status: COMPLETED | OUTPATIENT
Start: 2020-04-01 | End: 2020-04-01

## 2020-04-01 RX ORDER — SULFAMETHOXAZOLE AND TRIMETHOPRIM 800; 160 MG/1; MG/1
1 TABLET ORAL DAILY
Qty: 7 TABLET | Refills: 0 | Status: SHIPPED | OUTPATIENT
Start: 2020-04-01 | End: 2020-04-09

## 2020-04-01 RX ORDER — LIDOCAINE HYDROCHLORIDE 10 MG/ML
20 INJECTION INFILTRATION; PERINEURAL ONCE
Status: DISCONTINUED | OUTPATIENT
Start: 2020-04-01 | End: 2020-04-02 | Stop reason: HOSPADM

## 2020-04-01 RX ORDER — HYDROMORPHONE HYDROCHLORIDE 2 MG/ML
INJECTION, SOLUTION INTRAMUSCULAR; INTRAVENOUS; SUBCUTANEOUS
Status: DISCONTINUED | OUTPATIENT
Start: 2020-04-01 | End: 2020-04-01

## 2020-04-01 RX ORDER — DEXTROMETHORPHAN HYDROBROMIDE, GUAIFENESIN 5; 100 MG/5ML; MG/5ML
650 LIQUID ORAL EVERY 6 HOURS
Qty: 56 TABLET | Refills: 0 | Status: SHIPPED | OUTPATIENT
Start: 2020-04-01 | End: 2021-01-28

## 2020-04-01 RX ORDER — CEFAZOLIN SODIUM 1 G/3ML
2 INJECTION, POWDER, FOR SOLUTION INTRAMUSCULAR; INTRAVENOUS
Status: ACTIVE | OUTPATIENT
Start: 2020-04-01 | End: 2020-04-01

## 2020-04-01 RX ORDER — ROCURONIUM BROMIDE 10 MG/ML
INJECTION, SOLUTION INTRAVENOUS
Status: DISCONTINUED | OUTPATIENT
Start: 2020-04-01 | End: 2020-04-01

## 2020-04-01 RX ADMIN — ACETAMINOPHEN 500 MG: 500 TABLET ORAL at 06:04

## 2020-04-01 RX ADMIN — VANCOMYCIN HYDROCHLORIDE 1750 MG: 750 INJECTION, POWDER, LYOPHILIZED, FOR SOLUTION INTRAVENOUS at 08:04

## 2020-04-01 RX ADMIN — KETOROLAC TROMETHAMINE 30 MG: 30 INJECTION, SOLUTION INTRAMUSCULAR; INTRAVENOUS at 01:04

## 2020-04-01 RX ADMIN — SODIUM CHLORIDE: 0.9 INJECTION, SOLUTION INTRAVENOUS at 07:04

## 2020-04-01 RX ADMIN — FENTANYL CITRATE 50 MCG: 50 INJECTION, SOLUTION INTRAMUSCULAR; INTRAVENOUS at 08:04

## 2020-04-01 RX ADMIN — CEFAZOLIN 2 G: 1 INJECTION, POWDER, FOR SOLUTION INTRAMUSCULAR; INTRAVENOUS at 06:04

## 2020-04-01 RX ADMIN — ROCURONIUM BROMIDE 40 MG: 10 INJECTION, SOLUTION INTRAVENOUS at 08:04

## 2020-04-01 RX ADMIN — HALOPERIDOL LACTATE 0.5 MG: 5 INJECTION, SOLUTION INTRAMUSCULAR at 04:04

## 2020-04-01 RX ADMIN — METHOCARBAMOL TABLETS 500 MG: 500 TABLET, COATED ORAL at 09:04

## 2020-04-01 RX ADMIN — HYDROMORPHONE HYDROCHLORIDE 0.2 MG: 1 INJECTION, SOLUTION INTRAMUSCULAR; INTRAVENOUS; SUBCUTANEOUS at 04:04

## 2020-04-01 RX ADMIN — HYDROMORPHONE HYDROCHLORIDE 0.2 MG: 2 INJECTION, SOLUTION INTRAMUSCULAR; INTRAVENOUS; SUBCUTANEOUS at 12:04

## 2020-04-01 RX ADMIN — CEFAZOLIN 2 G: 330 INJECTION, POWDER, FOR SOLUTION INTRAMUSCULAR; INTRAVENOUS at 04:04

## 2020-04-01 RX ADMIN — PHENYLEPHRINE HYDROCHLORIDE 100 MCG: 10 INJECTION INTRAVENOUS at 08:04

## 2020-04-01 RX ADMIN — GABAPENTIN 100 MG: 100 CAPSULE ORAL at 03:04

## 2020-04-01 RX ADMIN — DEXTROSE AND SODIUM CHLORIDE: 5; .9 INJECTION, SOLUTION INTRAVENOUS at 02:04

## 2020-04-01 RX ADMIN — Medication 10 MG: at 12:04

## 2020-04-01 RX ADMIN — TRANEXAMIC ACID 1000 MG: 100 INJECTION, SOLUTION INTRAVENOUS at 08:04

## 2020-04-01 RX ADMIN — METHOCARBAMOL TABLETS 500 MG: 500 TABLET, COATED ORAL at 06:04

## 2020-04-01 RX ADMIN — DEXAMETHASONE SODIUM PHOSPHATE 8 MG: 4 INJECTION, SOLUTION INTRAMUSCULAR; INTRAVENOUS at 01:04

## 2020-04-01 RX ADMIN — ROCURONIUM BROMIDE 10 MG: 10 INJECTION, SOLUTION INTRAVENOUS at 08:04

## 2020-04-01 RX ADMIN — HYDROMORPHONE HYDROCHLORIDE 0.4 MG: 2 INJECTION, SOLUTION INTRAMUSCULAR; INTRAVENOUS; SUBCUTANEOUS at 01:04

## 2020-04-01 RX ADMIN — VANCOMYCIN HYDROCHLORIDE 1000 MG: 1 INJECTION, POWDER, LYOPHILIZED, FOR SOLUTION INTRAVENOUS at 07:04

## 2020-04-01 RX ADMIN — FENTANYL CITRATE 50 MCG: 50 INJECTION, SOLUTION INTRAMUSCULAR; INTRAVENOUS at 09:04

## 2020-04-01 RX ADMIN — OXYCODONE HYDROCHLORIDE 5 MG: 5 TABLET ORAL at 03:04

## 2020-04-01 RX ADMIN — ONDANSETRON 8 MG: 8 TABLET, ORALLY DISINTEGRATING ORAL at 02:04

## 2020-04-01 RX ADMIN — MORPHINE SULFATE 6 MG: 2 INJECTION, SOLUTION INTRAMUSCULAR; INTRAVENOUS at 04:04

## 2020-04-01 RX ADMIN — PROPOFOL 180 MG: 10 INJECTION, EMULSION INTRAVENOUS at 08:04

## 2020-04-01 RX ADMIN — Medication 20 MG: at 08:04

## 2020-04-01 RX ADMIN — SODIUM CHLORIDE, SODIUM GLUCONATE, SODIUM ACETATE, POTASSIUM CHLORIDE, MAGNESIUM CHLORIDE, SODIUM PHOSPHATE, DIBASIC, AND POTASSIUM PHOSPHATE: .53; .5; .37; .037; .03; .012; .00082 INJECTION, SOLUTION INTRAVENOUS at 08:04

## 2020-04-01 RX ADMIN — LIDOCAINE HYDROCHLORIDE 100 MG: 20 INJECTION, SOLUTION INTRAVENOUS at 08:04

## 2020-04-01 RX ADMIN — MIDAZOLAM HYDROCHLORIDE 2 MG: 1 INJECTION, SOLUTION INTRAMUSCULAR; INTRAVENOUS at 08:04

## 2020-04-01 RX ADMIN — GABAPENTIN 100 MG: 100 CAPSULE ORAL at 08:04

## 2020-04-01 RX ADMIN — SUCCINYLCHOLINE CHLORIDE 140 MG: 20 INJECTION, SOLUTION INTRAMUSCULAR; INTRAVENOUS at 08:04

## 2020-04-01 RX ADMIN — CEFAZOLIN 2 G: 330 INJECTION, POWDER, FOR SOLUTION INTRAMUSCULAR; INTRAVENOUS at 08:04

## 2020-04-01 RX ADMIN — HYDROMORPHONE HYDROCHLORIDE 0.4 MG: 2 INJECTION, SOLUTION INTRAMUSCULAR; INTRAVENOUS; SUBCUTANEOUS at 11:04

## 2020-04-01 RX ADMIN — ACETAMINOPHEN 500 MG: 500 TABLET ORAL at 12:04

## 2020-04-01 RX ADMIN — CELECOXIB 100 MG: 100 CAPSULE ORAL at 04:04

## 2020-04-01 RX ADMIN — DEXTROSE AND SODIUM CHLORIDE: 5; .9 INJECTION, SOLUTION INTRAVENOUS at 12:04

## 2020-04-01 RX ADMIN — MORPHINE SULFATE 6 MG: 2 INJECTION, SOLUTION INTRAMUSCULAR; INTRAVENOUS at 01:04

## 2020-04-01 RX ADMIN — HYDROMORPHONE HYDROCHLORIDE 0.6 MG: 2 INJECTION, SOLUTION INTRAMUSCULAR; INTRAVENOUS; SUBCUTANEOUS at 11:04

## 2020-04-01 RX ADMIN — FENTANYL CITRATE 50 MCG: 50 INJECTION, SOLUTION INTRAMUSCULAR; INTRAVENOUS at 10:04

## 2020-04-01 RX ADMIN — Medication 10 MG: at 11:04

## 2020-04-01 RX ADMIN — MORPHINE SULFATE 2 MG: 2 INJECTION, SOLUTION INTRAMUSCULAR; INTRAVENOUS at 02:04

## 2020-04-01 NOTE — DISCHARGE INSTRUCTIONS
Please keep dressing clean dry and intact until follow up  Follow up in  1 weeks  Agresively ice and elevate extremity  Non weight bearing LLE  Pain control per prescription

## 2020-04-01 NOTE — PROGRESS NOTES
"[facility]  Orthopedics  Progress Note    Patient Name: Jonatan Reina  MRN: [id number]  Admission Date: 3/31/2020  Hospital Length of Stay: 1 days  Attending Provider: Giovanni Ware MD  Primary Care Provider: Dhaval Crowell MD  Follow-up For: Procedure(s) (LRB):  ORIF, FRACTURE, FEMUR - left.  Supine, Trios.  Synthes knows.  Medium triangle.  Cysto tubing and saline.  Bactisure.  (This is accurate, Jeanie wrote it - old surg 3 weeks ago with wound dehiscence).  Paralysis (Left)    Post-Operative Day: Day of Surgery  Subjective:     Principal Problem:<principal problem not specified>    Principal Orthopedic Problem: left femur shaft fracture    Interval History: Pt seen and examined at bedside. NAEO. He reports pain is controlled. Plan for OR this morning.      Review of patient's allergies indicates:  No Known Allergies    Current Facility-Administered Medications   Medication    acetaminophen tablet 500 mg    ceFAZolin injection 2 g    ceFAZolin injection 2 g    dextrose 5 % and 0.9 % NaCl infusion    lidocaine HCL 10 mg/ml (1%) injection 20 mL    morphine injection 6 mg    ondansetron disintegrating tablet 8 mg    ondansetron injection 4 mg    sodium chloride 0.9% flush 10 mL    vancomycin 1.5 g in dextrose 5 % 250 mL IVPB (ready to mix)    vancomycin in dextrose 5 % 1 gram/250 mL IVPB 1,000 mg    vancomycin injection 1,000 mg     Objective:     Vital Signs (Most Recent):  Temp: 98.6 °F (37 °C) (04/01/20 0421)  Pulse: 67 (04/01/20 0421)  Resp: 20 (04/01/20 0154)  BP: 139/78 (04/01/20 0421)  SpO2: 96 % (04/01/20 0421) Vital Signs (24h Range):  Temp:  [97.7 °F (36.5 °C)-98.6 °F (37 °C)] 98.6 °F (37 °C)  Pulse:  [56-70] 67  Resp:  [18-22] 20  SpO2:  [96 %-99 %] 96 %  BP: (129-163)/(74-86) 139/78     Weight: 86.2 kg (190 lb)  Height: 5' 10" (177.8 cm)  Body mass index is 27.26 kg/m².      Intake/Output Summary (Last 24 hours) at 4/1/2020 0618  Last data filed at " 4/1/2020 0516  Gross per 24 hour   Intake 0 ml   Output 600 ml   Net -600 ml       Ortho/SPM Exam     Betadine soaked gauze in place over wound  Very TTP over L thigh and distal femur  Compartments soft  Pain limited ROM of L hip and knee 2/2 known femur shaft fracture. Full painles ROM L ankle  SILT Sa/Timmons/DP/SP/T  Motor intact EHL/FHL/TA/Gastroc  2+ DP, 2+ PT      Significant Labs: All pertinent labs within the past 24 hours have been reviewed.    Significant Imaging: I have reviewed and interpreted all pertinent imaging results/findings.    Assessment/Plan:     Displaced fracture of shaft of left femur  Patient is a 30 yo M with an open distal third left femoral shaft fracture, periprosthetic to prior distal femur ORIF performed 3.5 weeks prior. Neurovascular intact. He was given IV ancef and vancomycin in ER and tetanus was updated. Dehisced incision site was thoroughly irrigated with 1L saline and betadine. 1 gram of vancomycin powder placed in the wound and then 2 simple 3-0 nylon sutures were placed to loosely approximate skin. Betadine soaked guaze then placed over the wound and ace wrap applied over this. Patient obtained CT of knee and CT of L hip in ER. Plan for irrigation and debridement with operative fixation with possible hardware removal today. Patient is consented, marked, and case is booked. Strict bed rest. NPO.  IV abx ordered for at least 24 hours post closure.       To OR today.    The risks, benefits and alternatives to surgery were discussed with the patient at great length.  These include pain, bleeding, infection, vessel/nerve damage, numbness, tingling, complex regional pain syndrome, recurrent infection need for further surgery, scarring, malunion, nonunion,hardware failure, limb length discrepancy, limb rotational mal alignment, hardware breakage, iatrogenic fracture, periprosthetic fracture, wound healing complications requiring further procedure for soft tissue coverage including flap  coverage, cartilage damage,  DVT, PE, arthritis and death. The risk of infection given dehiscence of prior incision and the complications of infection discussed with patient including need for further surgeries. Patient states an understanding and wishes to proceed with surgery.   All questions were answered.  No guarantees were implied or stated.  Informed consent was obtained.                   Lawrence Smith MD  Orthopedics  [facility]

## 2020-04-01 NOTE — ED PROVIDER NOTES
Encounter Date: 3/31/2020       History     Chief Complaint   Patient presents with    Hip Injury     Hx of femur fracture three weeks ago where he had surgery. Pt fell today while exercising. Pt left leg is internally rotated     29 year old man with history of distal fib fracture repair 3 weeks ago presents status post a fall. Patient is in extreme pain and unable to give full history at this time. Patient reports that he is normally supposed to have a brace on his leg and sees PT on a regular basis. Today, he went outside for a walk in his driveway for self physical therapy, put minimal pressure on his leg and fell mechanically. Patient arrives via EMS, who gave him .125 mcg of fentyl which did relieve the pain somewhat. Per EMS, his left foot was internally rotated, cardboard brace was added in the field. Unknown tetanus status     Denies any fevers, chills, shortness of breath, nausea, abdominal pain or sick contacts. Surgery was done by Dr. Garry Lee IV of [facility].         Review of patient's allergies indicates:  No Known Allergies  No past medical history on file.  Past Surgical History:   Procedure Laterality Date    arm surgery      CERVICAL SPINE SURGERY      ORIF FEMUR FRACTURE Left 3/6/2020    Procedure: ORIF, FRACTURE, FEMUR DISTAL;  Surgeon: Garry Sosa IV, MD;  Location: [facility];  Service: Orthopedics;  Laterality: Left;    Correct card and give to Rosie    TYMPANOSTOMY TUBE PLACEMENT       No family history on file.  Social History     Tobacco Use    Smoking status: Never Smoker   Substance Use Topics    Alcohol use: Yes     Comment: very rare    Drug use: Yes     Types: Marijuana     Review of Systems   Constitutional: Negative for chills and fever.   HENT: Negative for rhinorrhea and sore throat.    Respiratory: Negative for cough and shortness of breath.    Cardiovascular: Negative for chest pain and palpitations.   Gastrointestinal: Negative for abdominal pain,  constipation, diarrhea and nausea.   Genitourinary: Negative for dysuria and flank pain.   Musculoskeletal:        Left knee pain   Neurological: Negative for light-headedness, numbness and headaches.       Physical Exam     Initial Vitals [03/31/20 2027]   BP Pulse Resp Temp SpO2   132/74 (!) 56 18 97.7 °F (36.5 °C) 98 %      MAP       --         Physical Exam    Nursing note and vitals reviewed.  Constitutional: He appears well-developed and well-nourished. He is not diaphoretic.  Non-toxic appearance. He does not appear ill. He appears distressed.   HENT:   Head: Normocephalic and atraumatic.   Mouth/Throat: Oropharynx is clear and moist. No oropharyngeal exudate.   Eyes: EOM are normal. Pupils are equal, round, and reactive to light. No scleral icterus.   Cardiovascular: Normal rate, regular rhythm and normal heart sounds.   No murmur heard.  Pulmonary/Chest: Effort normal and breath sounds normal. He has no wheezes. He exhibits no tenderness.   Abdominal: Soft. Normal appearance, normal aorta and bowel sounds are normal. He exhibits no distension. There is no tenderness. There is no CVA tenderness.   Musculoskeletal:   Swelling to left knee joint, laceration to medial aspect of joint. Dirt in surrounding area   Skin: Skin is warm and dry.         ED Course   Procedures  Labs Reviewed   CBC W/ AUTO DIFFERENTIAL - Abnormal; Notable for the following components:       Result Value    RBC 3.94 (*)     Hemoglobin 12.2 (*)     Hematocrit 37.4 (*)     All other components within normal limits   COMPREHENSIVE METABOLIC PANEL   PROTIME-INR   APTT   TYPE & SCREEN   ANTIBODY IDENTIFICATION   TYPE & SCREEN          Imaging Results          CT Hip Without Contrast Left (In process)                CT Knee Without Contrast Left (In process)                CT 3D RECON WITH INDEPENDENT WS (In process)                CT 3D RECON WITH INDEPENDENT WS (In process)                X-Ray Femur AP/LAT Left (Final result)  Result time  03/31/20 22:40:23    Final result by Darron Baker MD (03/31/20 22:40:23)                 Impression:      Acute displaced fracture involving the mid distal femoral shaft superior to the recently placed femoral hardware.    Healing distal femur fracture with associated postoperative changes.      Electronically signed by: Darron Baker MD  Date:    03/31/2020  Time:    22:40             Narrative:    EXAMINATION:  XR FEMUR 2 VIEW LEFT; XR KNEE 1 OR 2 VIEW LEFT; XR TIBIA FIBULA 2 VIEW LEFT    CLINICAL HISTORY:  Fracture.    TECHNIQUE:  Two views left femur, two views left knee, and two views left tibia/fibula.    COMPARISON:  None.    FINDINGS:  Left femur: Postoperative changes of open reduction internal fixation involving the left distal fibula.  There is an acute displaced spiral type fracture involving the mid distal femoral shaft superior to the femoral hardware.  Normal alignment at the hip and knee.    Left knee: Partially visualized postoperative changes and fracture involving the mid distal femoral shaft as discussed above.  Satisfactory alignment of intra-articular distal femur fracture.  Normal alignment at the knee joint.  Probable joint effusion and subcutaneous emphysema versus air in the suprapatellar joint space.    Left tibia/fibula: Lack of frontal radiograph limits evaluation.  No displaced fracture is identified.  No dislocation.                               X-Ray Tibia Fibula 2 View Left (Final result)  Result time 03/31/20 22:40:23    Final result by Darron Baker MD (03/31/20 22:40:23)                 Impression:      Acute displaced fracture involving the mid distal femoral shaft superior to the recently placed femoral hardware.    Healing distal femur fracture with associated postoperative changes.      Electronically signed by: Darron Baker MD  Date:    03/31/2020  Time:    22:40             Narrative:    EXAMINATION:  XR FEMUR 2 VIEW LEFT; XR KNEE 1 OR 2 VIEW LEFT; XR TIBIA  FIBULA 2 VIEW LEFT    CLINICAL HISTORY:  Fracture.    TECHNIQUE:  Two views left femur, two views left knee, and two views left tibia/fibula.    COMPARISON:  None.    FINDINGS:  Left femur: Postoperative changes of open reduction internal fixation involving the left distal fibula.  There is an acute displaced spiral type fracture involving the mid distal femoral shaft superior to the femoral hardware.  Normal alignment at the hip and knee.    Left knee: Partially visualized postoperative changes and fracture involving the mid distal femoral shaft as discussed above.  Satisfactory alignment of intra-articular distal femur fracture.  Normal alignment at the knee joint.  Probable joint effusion and subcutaneous emphysema versus air in the suprapatellar joint space.    Left tibia/fibula: Lack of frontal radiograph limits evaluation.  No displaced fracture is identified.  No dislocation.                               X-Ray Knee 1 or 2 View Left (Final result)  Result time 03/31/20 22:40:23   Procedure changed from X-Ray Knee 3 View Left     Final result by Darron Baker MD (03/31/20 22:40:23)                 Impression:      Acute displaced fracture involving the mid distal femoral shaft superior to the recently placed femoral hardware.    Healing distal femur fracture with associated postoperative changes.      Electronically signed by: Darron Baker MD  Date:    03/31/2020  Time:    22:40             Narrative:    EXAMINATION:  XR FEMUR 2 VIEW LEFT; XR KNEE 1 OR 2 VIEW LEFT; XR TIBIA FIBULA 2 VIEW LEFT    CLINICAL HISTORY:  Fracture.    TECHNIQUE:  Two views left femur, two views left knee, and two views left tibia/fibula.    COMPARISON:  None.    FINDINGS:  Left femur: Postoperative changes of open reduction internal fixation involving the left distal fibula.  There is an acute displaced spiral type fracture involving the mid distal femoral shaft superior to the femoral hardware.  Normal alignment at the hip  and knee.    Left knee: Partially visualized postoperative changes and fracture involving the mid distal femoral shaft as discussed above.  Satisfactory alignment of intra-articular distal femur fracture.  Normal alignment at the knee joint.  Probable joint effusion and subcutaneous emphysema versus air in the suprapatellar joint space.    Left tibia/fibula: Lack of frontal radiograph limits evaluation.  No displaced fracture is identified.  No dislocation.                               X-Ray Pelvis Routine AP (Final result)  Result time 03/31/20 22:19:00   Procedure changed from X-Ray Hip 2 View Left     Final result by Darron Baker MD (03/31/20 22:19:00)                 Impression:      No displaced fracture identified in the pelvis.      Electronically signed by: Darron Baker MD  Date:    03/31/2020  Time:    22:19             Narrative:    EXAMINATION:  XR PELVIS ROUTINE AP    CLINICAL HISTORY:  Fall;  Unspecified fall, initial encounter    TECHNIQUE:  AP view of the pelvis was performed.    COMPARISON:  None.    FINDINGS:  Cardiac wires and other support devices overlie the pelvis.  No displaced fracture is identified within the field of view.  Hips are symmetric.  No dislocation.                                 Medical Decision Making:   History:   Old Records Summarized: records from previous admission(s).  Initial Assessment:   29 year old man with history of recent distal femur fracture repair presents with left knee pain status post fall with associated surgical wound dehiscence. Differential includes mechanical fall, wound dehiscence, septic joint  Clinical Tests:   Lab Tests: Ordered and Reviewed  Radiological Study: Ordered and Reviewed  Medical Tests: Ordered and Reviewed  ED Management:  On arrival 8 mg of morphine administered  XR Hip, knee, femur, tib/fib,  ECG, PT/INR, PTT, CMP, CBC    2g ancef, TDAP  Ortho consult; will take him to OR                                 Clinical Impression:        ICD-10-CM ICD-9-CM   1. Fracture T14.8XXA 829.0   2. Fall W19.XXXA E888.9   3. Preop examination Z01.818 V72.84   4. Type III open displaced transverse fracture of shaft of left femur, initial encounter S72.322C 821.11             ED Disposition Condition    Admit                           Jaime Sanderson MD  Resident  04/01/20 0023

## 2020-04-01 NOTE — CONSULTS
[facility]  Orthopedics  Consult Note    Patient Name: Jonatan Reina  MRN: [id number]  Admission Date: 3/31/2020  Hospital Length of Stay: 1 days  Attending Provider: Giovanni Ware MD  Primary Care Provider: Dhaval Crowell MD    Patient information was obtained from patient and ER records.     Inpatient consult to Orthopedic Surgery  Consult performed by: Lawrence Smith MD  Consult ordered by: Lawrence Smith MD        Subjective:     Principal Problem:<principal problem not specified>    Chief Complaint:   Chief Complaint   Patient presents with    Hip Injury     Hx of femur fracture three weeks ago where he had surgery. Pt fell today while exercising. Pt left leg is internally rotated        HPI: Patient is a 30 yo M with pmh of left knee distal femur fracture with ORIF with Dr. Sosa at [facility] on 3/6/20 who presents with left femur injury to the same leg. He reports walking with his son outside trying not to put full weight on the leg and suddenly falling. He had immediate severe pain in the left thigh. He denies pain elsewhere. His incision also opened at that time. He and his wife report injury occurred at 745 pm this evening. His initial surgery was done for distal femur fracture sustained after falling down 5-6 stairs. Significant surgical history per patient of cervical spinal fusion of C2-3 and vascular repair in left forearm. He denies complains of chest pain, nausea, vomiting, diarrhea, shortness of breath, cough, or fever.     No past medical history on file.    Past Surgical History:   Procedure Laterality Date    arm surgery      CERVICAL SPINE SURGERY      ORIF FEMUR FRACTURE Left 3/6/2020    Procedure: ORIF, FRACTURE, FEMUR DISTAL;  Surgeon: Garry Sosa IV, MD;  Location: [facility];  Service: Orthopedics;  Laterality: Left;    Correct card and give to Rosie    TYMPANOSTOMY TUBE PLACEMENT         Review of patient's allergies  "indicates:  No Known Allergies    Current Facility-Administered Medications   Medication    acetaminophen tablet 500 mg    ceFAZolin injection 2 g    ceFAZolin injection 2 g    dextrose 5 % and 0.9 % NaCl infusion    lidocaine HCL 10 mg/ml (1%) injection 20 mL    morphine injection 6 mg    ondansetron disintegrating tablet 8 mg    ondansetron injection 4 mg    sodium chloride 0.9% flush 10 mL    vancomycin 1.5 g in dextrose 5 % 250 mL IVPB (ready to mix)    vancomycin in dextrose 5 % 1 gram/250 mL IVPB 1,000 mg    vancomycin injection 1,000 mg     Family History     None        Tobacco Use    Smoking status: Never Smoker   Substance and Sexual Activity    Alcohol use: Yes     Comment: very rare    Drug use: Yes     Types: Marijuana    Sexual activity: Not on file     ROS See ER Provider ROS  Objective:     Vital Signs (Most Recent):  Temp: 98.5 °F (36.9 °C) (04/01/20 0154)  Pulse: 69 (04/01/20 0154)  Resp: 20 (04/01/20 0154)  BP: 137/75 (04/01/20 0154)  SpO2: 98 % (04/01/20 0154) Vital Signs (24h Range):  Temp:  [97.7 °F (36.5 °C)-98.5 °F (36.9 °C)] 98.5 °F (36.9 °C)  Pulse:  [56-70] 69  Resp:  [18-22] 20  SpO2:  [97 %-99 %] 98 %  BP: (129-163)/(74-86) 137/75     Weight: 86.2 kg (190 lb)  Height: 5' 10" (177.8 cm)  Body mass index is 27.26 kg/m².    No intake or output data in the 24 hours ending 04/01/20 0244    Ortho/SPM Exam     Afebrile, Vital signs stable   Gen - well-developed, well-nourished, no acute distress  HEENT - normocephalic, atraumatic   CV - Regular rate   Pulm - Good inspiratory effort with unlaboured breathing    LLE   Incision over medial distal femur subcutaneous tissue has dehisced completely. Deep fascia appears gorssly intact.   Very TTP over L thigh and distal femur  Compartments soft  Pain limited ROM of L hip and knee 2/2 known femur shaft fracture. Full painles ROM L ankle  SILT Sa/Timmons/DP/SP/T  Motor intact EHL/FHL/TA/Gastroc  2+ DP, 2+ PT         Significant Labs: All " pertinent labs within the past 24 hours have been reviewed.    Significant Imaging: I have reviewed and interpreted all pertinent imaging results/findings.     XR L femur shows distal 1/3 femoral shaft fracture with well fixed surgical hardware in distal femur    CT L knee shows healing distal femur fracture with hardware in place. Intra articular air is seen    CT L hip shows no fracture or dislocation        Assessment/Plan:     Displaced fracture of shaft of left femur  Patient is a 28 yo M with an open distal third left femoral shaft fracture, periprosthetic to prior distal femur ORIF performed 3.5 weeks prior. Neurovascular intact. He was given IV ancef and vancomycin in ER and tetanus was updated. Dehisced incision site was thoroughly irrigated with 1L saline and betadine. 1 gram of vancomycin powder placed in the wound and then 2 simple 3-0 nylon sutures were placed to loosely approximate skin. Betadine soaked guaze then placed over the wound and ace wrap applied over this. Patient obtained CT of knee and CT of L hip in ER. Plan for irrigation and debridement with operative fixation with possible hardware removal tomorrow morning in OR. Patient is consented, marked, and case is booked. Strict bed rest. NPO.  IV abx ordered for at least 24 hours post closure.       The risks, benefits and alternatives to surgery were discussed with the patient at great length.  These include pain, bleeding, infection, vessel/nerve damage, numbness, tingling, complex regional pain syndrome, recurrent infection need for further surgery, scarring, malunion, nonunion,hardware failure, limb length discrepancy, limb rotational mal alignment, hardware breakage, iatrogenic fracture, periprosthetic fracture, wound healing complications requiring further procedure for soft tissue coverage including flap coverage, cartilage damage,  DVT, PE, arthritis and death. The risk of infection given dehiscence of prior incision and the  complications of infection discussed with patient including need for further surgeries. Patient states an understanding and wishes to proceed with surgery.   All questions were answered.  No guarantees were implied or stated.  Informed consent was obtained.      Procedure note:  After time out was performed and patient ID, side, and site were verified, the left knee was sterilly prepped in the standard fashion.  A 22-gauge needle was introduced into the laceration without complication and 7 cc of 1% lidocaine was then injected without difficulty.  After adequate analgesia was obtained, the laceration was thoroughly irrigated with 1 L of normal saline and betadine. At this point, 1 gram of vanc powder was placed into the wound.  The wound was loosely aproximated using 2 simple interrupted sutures of 3-0 nylon. The patient tolerated the procedure well with no complications.  Blood loss was minimal.              Lawrence Smith MD  Orthopedics  [facility]

## 2020-04-01 NOTE — NURSING TRANSFER
Nursing Transfer Note      4/1/2020     Transfer To: 1002    Transfer via bed    Transfer with Wound vac, leg brace,  and IV pole    Transported by Transport    Medicines sent: NA    Chart send with patient: Yes    Notified: spouse    Patient reassessed at: 0342 04/01/2020    Prevena device sent with patient to room

## 2020-04-01 NOTE — ASSESSMENT & PLAN NOTE
Patient is a 28 yo M with an open distal third left femoral shaft fracture, periprosthetic to prior distal femur ORIF performed 3.5 weeks prior. Neurovascular intact. He was given IV ancef and vancomycin in ER and tetanus was updated. Dehisced incision site was thoroughly irrigated with 1L saline and betadine. 1 gram of vancomycin powder placed in the wound and then 2 simple 3-0 nylon sutures were placed to loosely approximate skin. Betadine soaked guaze then placed over the wound and ace wrap applied over this. Patient obtained CT of knee and CT of L hip in ER. Plan for irrigation and debridement with operative fixation with possible hardware removal today. Patient is consented, marked, and case is booked. Strict bed rest. NPO.  IV abx ordered for at least 24 hours post closure.       To OR today.    The risks, benefits and alternatives to surgery were discussed with the patient at great length.  These include pain, bleeding, infection, vessel/nerve damage, numbness, tingling, complex regional pain syndrome, recurrent infection need for further surgery, scarring, malunion, nonunion,hardware failure, limb length discrepancy, limb rotational mal alignment, hardware breakage, iatrogenic fracture, periprosthetic fracture, wound healing complications requiring further procedure for soft tissue coverage including flap coverage, cartilage damage,  DVT, PE, arthritis and death. The risk of infection given dehiscence of prior incision and the complications of infection discussed with patient including need for further surgeries. Patient states an understanding and wishes to proceed with surgery.   All questions were answered.  No guarantees were implied or stated.  Informed consent was obtained.

## 2020-04-01 NOTE — SUBJECTIVE & OBJECTIVE
"No past medical history on file.    Past Surgical History:   Procedure Laterality Date    arm surgery      CERVICAL SPINE SURGERY      ORIF FEMUR FRACTURE Left 3/6/2020    Procedure: ORIF, FRACTURE, FEMUR DISTAL;  Surgeon: Garry Sosa IV, MD;  Location: [facility];  Service: Orthopedics;  Laterality: Left;    Correct card and give to Rosie    TYMPANOSTOMY TUBE PLACEMENT         Review of patient's allergies indicates:  No Known Allergies    Current Facility-Administered Medications   Medication    acetaminophen tablet 500 mg    ceFAZolin injection 2 g    ceFAZolin injection 2 g    dextrose 5 % and 0.9 % NaCl infusion    lidocaine HCL 10 mg/ml (1%) injection 20 mL    morphine injection 6 mg    ondansetron disintegrating tablet 8 mg    ondansetron injection 4 mg    sodium chloride 0.9% flush 10 mL    vancomycin 1.5 g in dextrose 5 % 250 mL IVPB (ready to mix)    vancomycin in dextrose 5 % 1 gram/250 mL IVPB 1,000 mg    vancomycin injection 1,000 mg     Family History     None        Tobacco Use    Smoking status: Never Smoker   Substance and Sexual Activity    Alcohol use: Yes     Comment: very rare    Drug use: Yes     Types: Marijuana    Sexual activity: Not on file     ROS See ER Provider ROS  Objective:     Vital Signs (Most Recent):  Temp: 98.5 °F (36.9 °C) (04/01/20 0154)  Pulse: 69 (04/01/20 0154)  Resp: 20 (04/01/20 0154)  BP: 137/75 (04/01/20 0154)  SpO2: 98 % (04/01/20 0154) Vital Signs (24h Range):  Temp:  [97.7 °F (36.5 °C)-98.5 °F (36.9 °C)] 98.5 °F (36.9 °C)  Pulse:  [56-70] 69  Resp:  [18-22] 20  SpO2:  [97 %-99 %] 98 %  BP: (129-163)/(74-86) 137/75     Weight: 86.2 kg (190 lb)  Height: 5' 10" (177.8 cm)  Body mass index is 27.26 kg/m².    No intake or output data in the 24 hours ending 04/01/20 0244    Ortho/SPM Exam     Afebrile, Vital signs stable   Gen - well-developed, well-nourished, no acute distress  HEENT - normocephalic, atraumatic   CV - Regular rate   Pulm - Good " inspiratory effort with unlaboured breathing    LLE   Incision over medial distal femur subcutaneous tissue has dehisced completely. Deep fascia appears gorssly intact.   Very TTP over L thigh and distal femur  Compartments soft  Pain limited ROM of L hip and knee 2/2 known femur shaft fracture. Full painles ROM L ankle  SILT Sa/Timmons/DP/SP/T  Motor intact EHL/FHL/TA/Gastroc  2+ DP, 2+ PT         Significant Labs: All pertinent labs within the past 24 hours have been reviewed.    Significant Imaging: I have reviewed and interpreted all pertinent imaging results/findings.     XR L femur shows distal 1/3 femoral shaft fracture with well fixed surgical hardware in distal femur    CT L knee shows healing distal femur fracture with hardware in place. Intra articular air is seen    CT L hip shows no fracture or dislocation

## 2020-04-01 NOTE — ASSESSMENT & PLAN NOTE
Patient is a 30 yo M with an open distal third left femoral shaft fracture, periprosthetic to prior distal femur ORIF performed 3.5 weeks prior. Neurovascular intact. He was given IV ancef and vancomycin in ER and tetanus was updated. Dehisced incision site was thoroughly irrigated with 1L saline and betadine. 1 gram of vancomycin powder placed in the wound and then 2 simple 3-0 nylon sutures were placed to loosely approximate skin. Betadine soaked guaze then placed over the wound and ace wrap applied over this. Patient obtained CT of knee and CT of L hip in ER. Plan for irrigation and debridement with operative fixation with possible hardware removal tomorrow morning in OR. Patient is consented, marked, and case is booked. Strict bed rest. NPO.  IV abx ordered for at least 24 hours post closure.       The risks, benefits and alternatives to surgery were discussed with the patient at great length.  These include pain, bleeding, infection, vessel/nerve damage, numbness, tingling, complex regional pain syndrome, recurrent infection need for further surgery, scarring, malunion, nonunion,hardware failure, limb length discrepancy, limb rotational mal alignment, hardware breakage, iatrogenic fracture, periprosthetic fracture, wound healing complications requiring further procedure for soft tissue coverage including flap coverage, cartilage damage,  DVT, PE, arthritis and death. The risk of infection given dehiscence of prior incision and the complications of infection discussed with patient including need for further surgeries. Patient states an understanding and wishes to proceed with surgery.   All questions were answered.  No guarantees were implied or stated.  Informed consent was obtained.      Procedure note:  After time out was performed and patient ID, side, and site were verified, the left knee was sterilly prepped in the standard fashion.  A 22-gauge needle was introduced into the laceration without complication  and 7 cc of 1% lidocaine was then injected without difficulty.  After adequate analgesia was obtained, the laceration was thoroughly irrigated with 1 L of normal saline and betadine. At this point, 1 gram of vanc powder was placed into the wound.  The wound was loosely aproximated using 2 simple interrupted sutures of 3-0 nylon. The patient tolerated the procedure well with no complications.  Blood loss was minimal.

## 2020-04-01 NOTE — PLAN OF CARE
Pre-op questions answered. Pt oriented to room, call light within reach. Instructed to call with questions or concerns. Will continue to monitor.

## 2020-04-01 NOTE — TRANSFER OF CARE
"Anesthesia Transfer of Care Note    Patient: Jonatan Reina    Procedure(s) Performed: Procedure(s) (LRB):  ORIF, FRACTURE, FEMUR (Left)  IRRIGATION AND DEBRIDEMENT, LOWER EXTREMITY (Left)  APPLICATION, WOUND VAC (Left)    Patient location: PACU    Anesthesia Type: general    Transport from OR: Transported from OR on room air with adequate spontaneous ventilation    Post pain: adequate analgesia    Post assessment: no apparent anesthetic complications and tolerated procedure well    Post vital signs: stable    Level of consciousness: responds to stimulation    Nausea/Vomiting: no vomiting    Complications: none    Transfer of care protocol was followed      Last vitals:   Visit Vitals  /75 (BP Location: Right arm, Patient Position: Lying)   Pulse (!) 121   Temp 36.5 °C (97.7 °F) (Temporal)   Resp 18   Ht 5' 10" (1.778 m)   Wt 86.2 kg (190 lb)   SpO2 96%   BMI 27.26 kg/m²     "

## 2020-04-01 NOTE — ANESTHESIA PREPROCEDURE EVALUATION
[facility]  Anesthesia Pre-Operative Evaluation         Patient Name: Jonatan Reina  YOB: 1990  MRN: [id number]    SUBJECTIVE:     Pre-operative evaluation for Procedure(s) (LRB):  ORIF, FRACTURE, FEMUR - left.  Supine, Trios.  Synthes knows.  Medium triangle.  Cysto tubing and saline.  Bactisure.  (This is accurate, Jeanie wrote it - old surg 3 weeks ago with wound dehiscence).  Paralysis (Left)     04/01/2020    Jonatan Reina is a 29 y.o. male w/o a significant PMHx who presented to OSH after mechanical fall on 02/2/20. Had primary intervention then, now to have interval intervention at [facility] by Dr. Ware.    Patient now presents for the above procedure(s).    LDA:        Closed/Suction Drain 03/06/20 1513 Left Knee Accordion 8 Fr. (Active)   Number of days: 25     Prev airway: None documented    Drips:   dextrose 5 % and 0.9 % NaCl 100 mL/hr at 04/01/20 0026       Patient Active Problem List   Diagnosis    Neck pain    S/P cervical spinal fusion    Low back pain    Closed comminuted intra-articular fracture of distal end of left femur    Fracture    Displaced fracture of shaft of left femur       Review of patient's allergies indicates:  No Known Allergies    Current Inpatient Medications:   acetaminophen  500 mg Oral Q6H    ceFAZolin (ANCEF) IVPB  2 g Intravenous Q8H    ceFAZolin (ANCEF) IVPB  2 g Intravenous Once Pre-Op    lidocaine HCL 10 mg/ml (1%)  20 mL Other Once    ondansetron  4 mg Intravenous ED 1 Time    vancomycin (VANCOCIN) IVPB  1,500 mg Intravenous Q12H    vancomycin (VANCOCIN) IVPB  1,000 mg Intravenous Once Pre-Op    vancomycin  1,000 mg Topical (Top) Once       No current facility-administered medications on file prior to encounter.      Current Outpatient Medications on File Prior to Encounter   Medication Sig Dispense Refill    aspirin (ECOTRIN) 81 MG EC  tablet Take 1 tablet (81 mg total) by mouth once daily. 30 tablet 0    HYDROcodone-acetaminophen (NORCO) 7.5-325 mg per tablet Take 1 tablet by mouth every 6 (six) hours as needed for Pain. 40 tablet 0       Past Surgical History:   Procedure Laterality Date    arm surgery      CERVICAL SPINE SURGERY      ORIF FEMUR FRACTURE Left 3/6/2020    Procedure: ORIF, FRACTURE, FEMUR DISTAL;  Surgeon: Garry Sosa IV, MD;  Location: [facility];  Service: Orthopedics;  Laterality: Left;    Correct card and give to Rosie    TYMPANOSTOMY TUBE PLACEMENT         Social History     Socioeconomic History    Marital status:      Spouse name: Not on file    Number of children: Not on file    Years of education: Not on file    Highest education level: Not on file   Occupational History    Not on file   Social Needs    Financial resource strain: Not on file    Food insecurity:     Worry: Not on file     Inability: Not on file    Transportation needs:     Medical: Not on file     Non-medical: Not on file   Tobacco Use    Smoking status: Never Smoker   Substance and Sexual Activity    Alcohol use: Yes     Comment: very rare    Drug use: Yes     Types: Marijuana    Sexual activity: Not on file   Lifestyle    Physical activity:     Days per week: Not on file     Minutes per session: Not on file    Stress: Not on file   Relationships    Social connections:     Talks on phone: Not on file     Gets together: Not on file     Attends Mosque service: Not on file     Active member of club or organization: Not on file     Attends meetings of clubs or organizations: Not on file     Relationship status: Not on file   Other Topics Concern    Not on file   Social History Narrative    Not on file       OBJECTIVE:     Vital Signs Range (Last 24H):  Temp:  [36.5 °C (97.7 °F)]   Pulse:  [56-70]   Resp:  [18-22]   BP: (129-163)/(74-86)   SpO2:  [97 %-99 %]       Significant Labs:  Lab Results   Component Value Date    WBC  6.55 03/31/2020    HGB 12.2 (L) 03/31/2020    HCT 37.4 (L) 03/31/2020     03/31/2020    ALT 13 03/31/2020    AST 19 03/31/2020     03/31/2020    K 3.8 03/31/2020     03/31/2020    CREATININE 1.1 03/31/2020    BUN 11 03/31/2020    CO2 27 03/31/2020    INR 1.0 03/31/2020       Diagnostic Studies: No relevant studies.    EKG:   No results found for this or any previous visit.    2D ECHO:  TTE:  No results found for this or any previous visit.    JAN:  No results found for this or any previous visit.    ASSESSMENT/PLAN:         Anesthesia Evaluation    I have reviewed the Patient Summary Reports.    I have reviewed the Nursing Notes.   I have reviewed the Medications.     Review of Systems  Anesthesia Hx:  History of prior surgery of interest to airway management or planning: Denies Family Hx of Anesthesia complications.   Denies Personal Hx of Anesthesia complications.          Anesthesia Plan  Type of Anesthesia, risks & benefits discussed:  Anesthesia Type:  general, regional  Patient's Preference:   Intra-op Monitoring Plan: standard ASA monitors  Intra-op Monitoring Plan Comments:   Post Op Pain Control Plan: multimodal analgesia, IV/PO Opioids PRN and per primary service following discharge from PACU  Post Op Pain Control Plan Comments:   Induction:   IV  Beta Blocker:  Patient is not currently on a Beta-Blocker (No further documentation required).       Informed Consent: Patient understands risks and agrees with Anesthesia plan.  Questions answered. Anesthesia consent signed with patient.  ASA Score: 2     Day of Surgery Review of History & Physical:            Ready For Surgery From Anesthesia Perspective.

## 2020-04-01 NOTE — PLAN OF CARE
Patient is alert and oriented. Able to make needs known. No c/o pain or discomfort noted. No s/s of respiratory/cardiac distress noted. Left leg incision remains to a wound vac set on a continuous setting of 125 mm Hg. PIVs are patent and flush well. IV fluids run continuously at 100 ml/hr. Patient has been placed on a regular diet and is tolerating it well. Perez catheter is patent and is draining yellow urine. VS stable. No issues or concerns at this time. Continue to monitor.

## 2020-04-01 NOTE — SUBJECTIVE & OBJECTIVE
"Principal Problem:<principal problem not specified>    Principal Orthopedic Problem: left femur shaft fracture    Interval History: Pt seen and examined at bedside. KHALIDA. He reports pain is controlled. Plan for OR this morning.      Review of patient's allergies indicates:  No Known Allergies    Current Facility-Administered Medications   Medication    acetaminophen tablet 500 mg    ceFAZolin injection 2 g    ceFAZolin injection 2 g    dextrose 5 % and 0.9 % NaCl infusion    lidocaine HCL 10 mg/ml (1%) injection 20 mL    morphine injection 6 mg    ondansetron disintegrating tablet 8 mg    ondansetron injection 4 mg    sodium chloride 0.9% flush 10 mL    vancomycin 1.5 g in dextrose 5 % 250 mL IVPB (ready to mix)    vancomycin in dextrose 5 % 1 gram/250 mL IVPB 1,000 mg    vancomycin injection 1,000 mg     Objective:     Vital Signs (Most Recent):  Temp: 98.6 °F (37 °C) (04/01/20 0421)  Pulse: 67 (04/01/20 0421)  Resp: 20 (04/01/20 0154)  BP: 139/78 (04/01/20 0421)  SpO2: 96 % (04/01/20 0421) Vital Signs (24h Range):  Temp:  [97.7 °F (36.5 °C)-98.6 °F (37 °C)] 98.6 °F (37 °C)  Pulse:  [56-70] 67  Resp:  [18-22] 20  SpO2:  [96 %-99 %] 96 %  BP: (129-163)/(74-86) 139/78     Weight: 86.2 kg (190 lb)  Height: 5' 10" (177.8 cm)  Body mass index is 27.26 kg/m².      Intake/Output Summary (Last 24 hours) at 4/1/2020 0699  Last data filed at 4/1/2020 0516  Gross per 24 hour   Intake 0 ml   Output 600 ml   Net -600 ml       Ortho/SPM Exam     Betadine soaked gauze in place over wound  Very TTP over L thigh and distal femur  Compartments soft  Pain limited ROM of L hip and knee 2/2 known femur shaft fracture. Full painles ROM L ankle  SILT Sa/Timmons/DP/SP/T  Motor intact EHL/FHL/TA/Gastroc  2+ DP, 2+ PT      Significant Labs: All pertinent labs within the past 24 hours have been reviewed.    Significant Imaging: I have reviewed and interpreted all pertinent imaging results/findings.  "

## 2020-04-01 NOTE — OP NOTE
OP NOTE    DOS:  04/01/2020    Preop Dx: 1.  Periprosthetic left femoral shaft fracture above prior fixation.    2.  Surgical incision dehiscence left medial knee with air in knee joint    Postop Dx: 1.  Periprosthetic left femoral shaft fracture above prior fixation.    2.  Surgical incision dehiscence left medial knee with air in knee joint    3.  Tear of vastus medialis obliquus at medial patella.    Procedure: 1.  Open reduction internal fixation of left femoral shaft fracture    2.  Excisional and non excisional debridement of wound dehiscence left distal thigh skin and subcutaneous tissue and muscle    3.  Left knee arthrotomy with irrigation for contaminated open joint    4.  Fasciocutaneous advancement flap medial left distal thigh 20 cm        Surgeon: Giovanni Ware M.D.    Asst:  Mike Rios M.D    Anesthesia: General endotracheal    EBL:  250 cc    IVF:  2500 cc crystalloid    Implants: Synthes 18 hole VA condylar plate with screws and cables    Specimens: None    Findings: Secure fixation.  Prior hardware secure.    Dispo:  To PACU extubated/stable       Indications for Procedure:      29-year-old male sustained a left distal femoral intercondylar fracture on the medial side for which he underwent ORIF on 03/06/2020 at another hospital.  Patient states he was walking and tripped going off a curb.  He presented to my emergency department with dehiscence of the distal part of his anteromedial incision and in overlying periprosthetic left femoral shaft fracture above his other fixation.  The patient was treated initially in emergency department with Ancef and vancomycin within a 4 hr window as well as irrigation, antibiotic powder in the wound followed by Betadine and a loose closure pending definitive exploration in the operating room.  He does have air in the joint on CT scan.  The risks, benefits and alternatives to surgery discussed at length the patient prior to going the operating room.  Informed  consent was obtained.    Procedure in Detail:    Patient identified the preoperative holding area the site was marked.  Patient was wheeled into the operating room and placed on the operating table in supine position.  General endotracheal anesthesia was induced and preoperative antibiotics were administered.  The left lower extremity was prepped and draped in sterile fashion.  A time-out was undertaken to confirm patient, side, site, surgery, surgeon and administration of preoperative antibiotics.  All agreed and we proceeded.    The distal portion of his prior incision had dehisced.  I extended this up a bit proximally.  I explored the underlying tissue.  The skin flaps were quite adherent to the underlying fascia.  His medial fascial incision extending into a medial parapatellar arthrotomy was intact.  He had torn the more proximal portion at the medial side of the patella up into the vastus medialis obliquus teeing off of the still intact closure alongside the patellar tendon.  I explored the entirety and the deep fascial layer was closed except for that portion over the VMO.  I removed the sutures from his more proximal closure leaving the portion between the patellar tendon and medial retinaculum.  I did this after doing a cursory irrigation of the superficial tissues with a significant amount of normal saline solution.    At this point I visualized his medial plate and the screws.  I visualized his joint line as well.  His distal fixation was completely intact.  I irrigated this area and his knee joint with normal saline solution followed by Bactisure, followed by more normal saline solution.  I then placed a dilute solution of Betadine within the wound and let this sit for several minutes before irrigating again with normal saline solution.  We also cleared some hematoma at the area.  At this point I visualized all the soft tissues in the area and determined that trying to take down the distal portion of his  soft tissues and gain access through that incision to a starting point for a retrograde femoral nail would be quite damaging to those soft tissues.  His completely intact and distal fixation for the condylar fracture was also still in perfect shape and has condyle still very well reduced and compressed.  For this reason I elected to go lateral for which I had very good skin bridge and treated shaft fracture with a plate.    I made an incision centered over his femoral shaft fracture on the lateral side incised through his iliotibial band.  I bluntly dissected through the vastus lateralis to gain access to the fracture area.  I cleared out and irrigated more hematoma in this area.  At this point I passed 2 cables around the oblique fracture tensioning 1 of them to get a provisional reduction on the shaft component.  I then made a distal incision overlying the lateral condyle with a slightly posterior or central position to maintain a good skin bridge from the other side.  I dissected down level of the lateral condyle distally.  I then sub muscularly slid a plate on the lateral side to check for length and decided on an 18 hole VA condylar plate.  I bent the tip of this to match the contour up toward the vastus ridge.  His 2 screws at the joint distally from the medial side were bicortical and I was able to place the plate such that these had some relief within several distal holes of the plate to keep the lateral plate from being prominent.  I set this in AP and lateral view on the C-arm so the plate was riding the appropriate position distally and then pinned this through 1 of the wire holes to hold in place.  I had loosened my initial cable after sizing the plate and let the plate slide under both cables in the midshaft of the femur.  I retention to these over the plate to again get an anatomic reduction and hold the plate to bone.  The plate did want to ride slightly anterior proximally.  I made a proximal  incision around the area of the proximal femur just below the vastus ridge and also dissected down through the vastus lateralis bluntly.  At this point I placed a clamp around the plate to pull this into a more central position on the shaft.  I then again checked the fracture site to make sure that the fracture reduction was maintained the cables in good position and checked distally to make sure the plate was still in good position.    Placed a single cortical screw proximally the shaft to pull the plate all the way down to bone in good position.  I then again checked and retention my cables in the central portion and had still anatomic reduction of the femur.  I then went distally and placed a single bicortical conical screw coming out the medial femoral condyle and pulling the plate all the way down to bone.  I then placed several locking screws in the distal portion the plate and then removed the long bicortical screw placed this with a unicortical locking screw.  I then placed 2 more cortical screws proximally in the shaft above the fracture site.  I then placed another screw in the area the fracture getting good compression.  I placed another cortical screw below the level the fracture and just above the medial T-plate.  At this point I had good proximal and distal fixation and very long bridge up to the tip of the plate proximally.  I did not really want to put any screws in the thinner bone in the subtrochanteric region.  I used a locking tower to gain as much angle as I could and placed a single unicortical screw above the lesser trochanter going toward the femoral neck.    At this point I checked my fracture and plate positioning was quite happy with reduction and fixation.  His medial fixation also remained completely intact.  I again irrigated copious normal saline solution.  On the lateral side the iliotibial band was closed with 0 Vicryl suture throughout, the next layer of fascia with 0 Vicryl suture,  the subcutaneous tissue with 3-0 Vicryl suture, and skin with 3-0 nylon suture in running fashion.  Attention was then turned back to the medial side.    Again irrigated the medial side and we had a total of 10 L of irrigation prior to any the fixation.  His full-thickness skin fascia layer was adherent to the underlying muscle and I developed fasciocutaneous flaps with a 10.  Blade and electrocautery to mobilize a full-thickness flap on the medial side and make the underlying tissues repairable.  I did this for about 20 cm.  At this point I placed 1 g of vancomycin powder and 2 g of cefepime powder through the medial side in the area of the fractures and the joint.  I then repaired the medial fascia with number 1 antimicrobial Vicryl suture.  I repaired the portion of the vastus medialis which had avulsed from the medial side proximal to the original arthrotomy line with 2.  FiberWire suture in horizontal mattress fashion and 1 Vicryl suture in interrupted figure-of-eight fashion.  This gave a very nice closure.    At this point I finished mobilizing the full-thickness layers distally in order to get a tension-free closure of fascia and more superficial layers.  I excised sharply with a 10.  Blade about a mm of skin on either side of the original incision to get healthy bleeding tissue edges.  I then closed that fascia layer with 0 Vicryl suture, the subcutaneous tissue with inverted 3-0 Vicryl suture, and the skin with 3-0 nylon suture.  In order to help that tissue on the medial side heal I placed an incisional wound VAC with good suction seal.  Sterile dressings were also placed on the lateral side.    All instrument and sponge counts were reported correct in the case.  There were no complications.  The patient was extubated, awakened and taken to recovery room in stable condition after being placed in a knee immobilizer.    Plan for the patient:    I discussed at length with the patient his weight-bearing  restrictions at nonweightbearing for at least 10-12 weeks.  There had been some issues with weight-bearing compliance after his initial surgery although that initial fixation did hold up quite well.  I will keep him extended the knee immobilizer for about a week and then begin range of motion of the knee to let his medial soft tissue and vastus repair begin to heal.  I will leave the incisional wound VAC on the medial side for least a week.  I am going to give him several days of IV antibiotics followed by about a week of Bactrim.  We got to his wound fairly quickly knee got antibiotics within a good window as well as using Bactisure and dilute Betadine.  I did explain to the patient that he does have an increased risk for infection secondary to that dehiscence, however.    Giovanni Ware MD

## 2020-04-01 NOTE — ED TRIAGE NOTES
"Jonatan Reina, a 29 y.o. male presents to the ED via EMS w/ complaint of fall injury to left leg s/t excising PTA. Pt recently had a femur fracture surgery three weeks ago. Pt states, "I wanted to excersie to get it back to normal but I just fell on it." Per EMS, left leg was internally rotated. Pedal pulse +2 to the LLE. No numbness/tingling to LLE as well. 10/10 pain. AAOx4.    Triage note:  Chief Complaint   Patient presents with    Hip Injury     Hx of femur fracture three weeks ago where he had surgery. Pt fell today while exercising. Pt left leg is internally rotated     Review of patient's allergies indicates:  No Known Allergies  No past medical history on file.    "

## 2020-04-01 NOTE — HPI
Patient is a 30 yo M with pmh of left knee distal femur fracture with ORIF with Dr. Sosa at [facility] on 3/6/20 who presents with left femur injury to the same leg. He reports walking with his son outside trying not to put full weight on the leg and suddenly falling. He had immediate severe pain in the left thigh. He denies pain elsewhere. His incision also opened at that time. He and his wife report injury occurred at 745 pm this evening. His initial surgery was done for distal femur fracture sustained after falling down 5-6 stairs. Significant surgical history per patient of cervical spinal fusion of C2-3 and vascular repair in left forearm. He denies complains of chest pain, nausea, vomiting, diarrhea, shortness of breath, cough, or fever.

## 2020-04-02 VITALS
SYSTOLIC BLOOD PRESSURE: 125 MMHG | BODY MASS INDEX: 27.2 KG/M2 | DIASTOLIC BLOOD PRESSURE: 68 MMHG | RESPIRATION RATE: 18 BRPM | HEART RATE: 101 BPM | WEIGHT: 190 LBS | HEIGHT: 70 IN | TEMPERATURE: 99 F | OXYGEN SATURATION: 100 %

## 2020-04-02 PROCEDURE — 25000003 PHARM REV CODE 250: Performed by: STUDENT IN AN ORGANIZED HEALTH CARE EDUCATION/TRAINING PROGRAM

## 2020-04-02 PROCEDURE — 97530 THERAPEUTIC ACTIVITIES: CPT

## 2020-04-02 PROCEDURE — 97116 GAIT TRAINING THERAPY: CPT

## 2020-04-02 PROCEDURE — 97535 SELF CARE MNGMENT TRAINING: CPT

## 2020-04-02 PROCEDURE — 63600175 PHARM REV CODE 636 W HCPCS: Performed by: STUDENT IN AN ORGANIZED HEALTH CARE EDUCATION/TRAINING PROGRAM

## 2020-04-02 PROCEDURE — 97112 NEUROMUSCULAR REEDUCATION: CPT

## 2020-04-02 PROCEDURE — 97165 OT EVAL LOW COMPLEX 30 MIN: CPT

## 2020-04-02 PROCEDURE — 97162 PT EVAL MOD COMPLEX 30 MIN: CPT

## 2020-04-02 RX ORDER — OXYCODONE HYDROCHLORIDE 5 MG/1
5 TABLET ORAL EVERY 4 HOURS PRN
Status: DISCONTINUED | OUTPATIENT
Start: 2020-04-02 | End: 2020-04-02 | Stop reason: HOSPADM

## 2020-04-02 RX ORDER — OXYCODONE HYDROCHLORIDE 10 MG/1
10 TABLET ORAL EVERY 4 HOURS PRN
Status: DISCONTINUED | OUTPATIENT
Start: 2020-04-02 | End: 2020-04-02 | Stop reason: HOSPADM

## 2020-04-02 RX ORDER — ACETAMINOPHEN 325 MG/1
650 TABLET ORAL
Status: DISCONTINUED | OUTPATIENT
Start: 2020-04-02 | End: 2020-04-02 | Stop reason: HOSPADM

## 2020-04-02 RX ORDER — HYDROMORPHONE HYDROCHLORIDE 1 MG/ML
0.5 INJECTION, SOLUTION INTRAMUSCULAR; INTRAVENOUS; SUBCUTANEOUS
Status: DISCONTINUED | OUTPATIENT
Start: 2020-04-02 | End: 2020-04-02 | Stop reason: HOSPADM

## 2020-04-02 RX ADMIN — OXYCODONE HYDROCHLORIDE 10 MG: 10 TABLET ORAL at 05:04

## 2020-04-02 RX ADMIN — HYDROMORPHONE HYDROCHLORIDE 0.5 MG: 1 INJECTION, SOLUTION INTRAMUSCULAR; INTRAVENOUS; SUBCUTANEOUS at 11:04

## 2020-04-02 RX ADMIN — PROMETHAZINE HYDROCHLORIDE 12.5 MG: 25 INJECTION INTRAMUSCULAR; INTRAVENOUS at 06:04

## 2020-04-02 RX ADMIN — CELECOXIB 100 MG: 100 CAPSULE ORAL at 09:04

## 2020-04-02 RX ADMIN — MORPHINE SULFATE 3 MG: 2 INJECTION, SOLUTION INTRAMUSCULAR; INTRAVENOUS at 01:04

## 2020-04-02 RX ADMIN — METHOCARBAMOL TABLETS 500 MG: 500 TABLET, COATED ORAL at 04:04

## 2020-04-02 RX ADMIN — ENOXAPARIN SODIUM 40 MG: 100 INJECTION SUBCUTANEOUS at 04:04

## 2020-04-02 RX ADMIN — ACETAMINOPHEN 650 MG: 325 TABLET ORAL at 02:04

## 2020-04-02 RX ADMIN — OXYCODONE HYDROCHLORIDE 5 MG: 5 TABLET ORAL at 12:04

## 2020-04-02 RX ADMIN — DEXTROSE AND SODIUM CHLORIDE: 5; .9 INJECTION, SOLUTION INTRAVENOUS at 11:04

## 2020-04-02 RX ADMIN — HYDROMORPHONE HYDROCHLORIDE 0.5 MG: 1 INJECTION, SOLUTION INTRAMUSCULAR; INTRAVENOUS; SUBCUTANEOUS at 06:04

## 2020-04-02 RX ADMIN — OXYCODONE HYDROCHLORIDE 15 MG: 10 TABLET ORAL at 09:04

## 2020-04-02 RX ADMIN — CEFAZOLIN 2 G: 1 INJECTION, POWDER, FOR SOLUTION INTRAMUSCULAR; INTRAVENOUS at 12:04

## 2020-04-02 RX ADMIN — CEFAZOLIN 2 G: 1 INJECTION, POWDER, FOR SOLUTION INTRAMUSCULAR; INTRAVENOUS at 09:04

## 2020-04-02 RX ADMIN — GABAPENTIN 100 MG: 100 CAPSULE ORAL at 02:04

## 2020-04-02 RX ADMIN — OXYCODONE HYDROCHLORIDE 15 MG: 10 TABLET ORAL at 04:04

## 2020-04-02 RX ADMIN — VANCOMYCIN HYDROCHLORIDE 1750 MG: 750 INJECTION, POWDER, LYOPHILIZED, FOR SOLUTION INTRAVENOUS at 10:04

## 2020-04-02 RX ADMIN — METHOCARBAMOL TABLETS 500 MG: 500 TABLET, COATED ORAL at 09:04

## 2020-04-02 RX ADMIN — DEXTROSE AND SODIUM CHLORIDE: 5; .9 INJECTION, SOLUTION INTRAVENOUS at 12:04

## 2020-04-02 RX ADMIN — ACETAMINOPHEN 650 MG: 325 TABLET ORAL at 09:04

## 2020-04-02 RX ADMIN — ONDANSETRON 8 MG: 8 TABLET, ORALLY DISINTEGRATING ORAL at 04:04

## 2020-04-02 RX ADMIN — ONDANSETRON 8 MG: 8 TABLET, ORALLY DISINTEGRATING ORAL at 12:04

## 2020-04-02 RX ADMIN — METHOCARBAMOL TABLETS 500 MG: 500 TABLET, COATED ORAL at 02:04

## 2020-04-02 RX ADMIN — GABAPENTIN 100 MG: 100 CAPSULE ORAL at 09:04

## 2020-04-02 NOTE — PLAN OF CARE
Eval completed and POC established     Carlos Squires, PT, DPT  2020      Problem: Physical Therapy Goal  Goal: Physical Therapy Goal  Description  Goals to be met by: 2020    Patient will increase functional independence with mobility by performin. Supine to sit with Modified [place]  2. Sit to supine with Modified [place]  3. Sit to stand transfer with Modified [place]  5. Gait  x 50 feet with Modified [place] using LRAD and maintaining NWB status  6. Lower extremity exercise program x15 reps per handout, with independence    Outcome: Ongoing, Progressing

## 2020-04-02 NOTE — PLAN OF CARE
Patient is alert and oriented. Able to make needs known. PRN Oxycodone and Dilaudid given for pain control. No s/s of respiratory/cardiac distress noted. LLE remains to w awound vac set at 125 mm Hg. No drainage noted. PIVs are patent and flush well. IV antibiotics continue with no adverse reactions noted. IV fluids run continuously at 100 ml/hr. Patient remains on a regular diet and is tolerating it well. Low appetite noted. Perez catheter remains patent and draining yellow urine. VS stable. No issues or concerns at this time. Continue to monitor.

## 2020-04-02 NOTE — SUBJECTIVE & OBJECTIVE
"Principal Problem:Displaced fracture of shaft of left femur    Principal Orthopedic Problem: left femur shaft fracture    Interval History: Pt seen and examined at bedside. He tolerated surgery well yesterday. He states that he did not get any sleep last night due to pain. His pain medications have been reviewed and appropriately changed. His dressing is c/d/i. LLE in HKB locked in extension.     Review of patient's allergies indicates:  No Known Allergies    Current Facility-Administered Medications   Medication    acetaminophen tablet 650 mg    ceFAZolin injection 2 g    celecoxib capsule 100 mg    dextrose 5 % and 0.9 % NaCl infusion    enoxaparin injection 40 mg    gabapentin capsule 100 mg    HYDROmorphone injection 0.5 mg    lidocaine HCL 10 mg/ml (1%) injection 20 mL    methocarbamoL tablet 500 mg    ondansetron disintegrating tablet 8 mg    oxyCODONE immediate release tablet 15 mg    oxyCODONE immediate release tablet 5 mg    oxyCODONE immediate release tablet Tab 10 mg    promethazine (PHENERGAN) 12.5 mg in dextrose 5 % 50 mL IVPB    sodium chloride 0.9% flush 10 mL    vancomycin 1.75 g in 5 % dextrose 500 mL IVPB    vancomycin injection 1,000 mg     Objective:     Vital Signs (Most Recent):  Temp: 97.8 °F (36.6 °C) (04/02/20 0503)  Pulse: 103 (04/02/20 0503)  Resp: 16 (04/02/20 0503)  BP: (!) 116/58 (04/02/20 0503)  SpO2: 96 % (04/02/20 0503) Vital Signs (24h Range):  Temp:  [97.3 °F (36.3 °C)-98.6 °F (37 °C)] 97.8 °F (36.6 °C)  Pulse:  [] 103  Resp:  [10-41] 16  SpO2:  [95 %-100 %] 96 %  BP: (116-145)/(58-88) 116/58     Weight: 86.2 kg (190 lb)  Height: 5' 10" (177.8 cm)  Body mass index is 27.26 kg/m².      Intake/Output Summary (Last 24 hours) at 4/2/2020 0717  Last data filed at 4/2/2020 0506  Gross per 24 hour   Intake 3415 ml   Output 2100 ml   Net 1315 ml       Ortho/SPM Exam       Wound vac over medial distal thigh  HKB locked in extension  Surgical dressing " c/d/i  Compartments soft  SILT Sa/Timmons/DP/SP/T  Motor intact EHL/FHL/TA/Gastroc  2+ DP, 2+ PT      Significant Labs: All pertinent labs within the past 24 hours have been reviewed.    Significant Imaging: I have reviewed and interpreted all pertinent imaging results/findings.

## 2020-04-02 NOTE — PLAN OF CARE
Problem: Occupational Therapy Goal  Goal: Occupational Therapy Goal  Description  Goals to be met by: 4/12/2020     Patient will increase functional independence with ADLs by performing:    UE Dressing with [place].  LE Dressing with Modified [place] and Assistive Devices as needed.  Grooming while standing at sink with Supervision.  Toileting from toilet with Supervision for hygiene and clothing management.   Toilet transfer to toilet with Stand-by Assistance.     Outcome: Ongoing, Progressing    Yahir Livingston OTR/L  4/2/2020

## 2020-04-02 NOTE — PT/OT/SLP EVAL
Occupational Therapy   Evaluation & Treatment     Name: Jonatan Reina  MRN: [id number]  Admitting Diagnosis:  Displaced fracture of shaft of left femur 1 Day Post-Op    Recommendations:     Discharge Recommendations: outpatient PT  Discharge Equipment Recommendations:  crutches, axillary, bedside commode  Barriers to discharge:  None    Assessment:     Jonatan Reina is a 29 y.o. male with a medical diagnosis of Displaced fracture of shaft of left femur.  He presents with impairments listed below. Pt did well to participate and tolerate session. Pt presents w/ overall deficits for ADLs and mobility at this time. Pt is functioning below baseline and is unable to assume prior life roles. Pt displayed global deconditioning requiring increased assist for ADLs and mobility at this time. Pt would benefit from skilled OT services to improve independence and overall occupational functioning.     Performance deficits affecting function: impaired endurance, impaired self care skills, impaired functional mobilty, gait instability, impaired balance, decreased lower extremity function, pain, decreased ROM, orthopedic precautions.      Rehab Prognosis: Good; patient would benefit from acute skilled OT services to address these deficits and reach maximum level of function.       Plan:     Patient to be seen daily to address the above listed problems via self-care/home management, therapeutic activities, therapeutic exercises  · Plan of Care Expires:    · Plan of Care Reviewed with: patient    Subjective     Chief Complaint: LLE pain  Patient/Family Comments/goals: return home    Occupational Profile:  Living Environment: Pt lives w/ spouse and 2 sons in a 2sh w/ no steps to enter. Pt able to reside on the 1st floor.  Previous level of function: Indep for ADLs and MOD I (Crutches) for mobility.   Roles and Routines: N/A  Equipment Used at Home:  none  Assistance upon Discharge: Pt has assistance upon D/C.      Pain/Comfort:  · Pain Rating 1: 5/10  · Location - Side 1: Left  · Location - Orientation 1: generalized  · Location 1: thigh  · Pain Addressed 1: Pre-medicate for activity, Reposition, Distraction  · Pain Rating Post-Intervention 1: (did not rate)    Patients cultural, spiritual, Baptist conflicts given the current situation:      Objective:     Communicated with: RN prior to session.  Patient found HOB elevated with knee immobilizer, peripheral IV, wound vac upon OT entry to room.    General Precautions: Standard, fall   Orthopedic Precautions:LLE non weight bearing   Braces: Hinged knee brace     Occupational Performance:    Bed Mobility:    · Patient completed Scooting/Bridging with minimum assistance and LLE management  · Patient completed Supine to Sit with minimum assistance and LLE management    Functional Mobility/Transfers:  · Patient completed Sit <> Stand Transfer with contact guard assistance  with  rolling walker   · Patient completed Bed <> Chair Transfer using Step Transfer technique with contact guard assistance with no assistive device  · Functional Mobility: Pt ambulated ~3 ft at cga w/ RW. PT returned in PM for further gait assessment. Refer to PT note for details.    Activities of Daily Living:  · Grooming: independence oral hygiene  · Bathing: supervision rag bath seated EOB  · Upper Body Dressing: set-up assistance  donned and doffed gown seated EOB  · Lower Body Dressing: maximal assistance donned socks seated EOB    Cognitive/Visual Perceptual:  Cognitive/Psychosocial Skills:     -       Oriented to: Person, Place, Time and Situation   -       Follows Commands/attention:Follows multistep  commands  -       Communication: clear/fluent  -       Memory: No Deficits noted  -       Safety awareness/insight to disability: intact   -       Mood/Affect/Coping skills/emotional control: Appropriate to situation  Visual/Perceptual:      -Intact      Physical Exam:  Balance:    -       cga for  balance w/ t/f  Postural examination/scapula alignment:    -       Rounded shoulders  Skin integrity: Visible skin intact  Upper Extremity Range of Motion:     -       Right Upper Extremity: WFL  -       Left Upper Extremity: WFL  Upper Extremity Strength:    -       Right Upper Extremity: WFL  -       Left Upper Extremity: WFL   Strength:    -       Right Upper Extremity: WFL  -       Left Upper Extremity: WFL  Fine Motor Coordination:    -       Intact  Gross motor coordination:   WFL    AMPAC 6 Click ADL:  AMPAC Total Score: 19    Treatment & Education:  Pt educated on POC and weight bearing precautions.   Education:    Patient left up in chair with all lines intact and call button in reach    GOALS:   Multidisciplinary Problems     Occupational Therapy Goals        Problem: Occupational Therapy Goal    Goal Priority Disciplines Outcome Interventions   Occupational Therapy Goal     OT, PT/OT Ongoing, Progressing    Description:  Goals to be met by: 4/12/2020     Patient will increase functional independence with ADLs by performing:    UE Dressing with [place].  LE Dressing with Modified [place] and Assistive Devices as needed.  Grooming while standing at sink with Supervision.  Toileting from toilet with Supervision for hygiene and clothing management.   Toilet transfer to toilet with Stand-by Assistance.                      History:     History reviewed. No pertinent past medical history.    Past Surgical History:   Procedure Laterality Date    APPLICATION OF WOUND VACUUM-ASSISTED CLOSURE DEVICE Left 4/1/2020    Procedure: APPLICATION, WOUND VAC;  Surgeon: Giovanni Ware MD;  Location: [address];  Service: Orthopedics;  Laterality: Left;    arm surgery      CERVICAL SPINE SURGERY      IRRIGATION AND DEBRIDEMENT OF LOWER EXTREMITY Left 4/1/2020    Procedure: IRRIGATION AND DEBRIDEMENT, LOWER EXTREMITY;  Surgeon: Giovanni Ware MD;  Location: [address];  Service: Orthopedics;   Laterality: Left;    ORIF FEMUR FRACTURE Left 3/6/2020    Procedure: ORIF, FRACTURE, FEMUR DISTAL;  Surgeon: Garry Sosa IV, MD;  Location: [facility];  Service: Orthopedics;  Laterality: Left;    Correct card and give to Rosie    ORIF FEMUR FRACTURE Left 4/1/2020    Procedure: ORIF, FRACTURE, FEMUR;  Surgeon: Giovanni Ware MD;  Location: [address];  Service: Orthopedics;  Laterality: Left;    TYMPANOSTOMY TUBE PLACEMENT         Time Tracking:     OT Date of Treatment: 04/02/20  OT Start Time: 1012  OT Stop Time: 1047  OT Total Time (min): 35 min    Billable Minutes:Evaluation 12 minutes  Self Care/Home Management 23 minutes    Yahir Livingston OT  4/2/2020

## 2020-04-02 NOTE — PLAN OF CARE
Patient evaluated at bedside in room 1002. Patient resting comfortably. Not in apparent distress. Pain controlled, talking on the phone.     Exam: Dressing c/d/i. Wound vac with excellent suction  Motor intact T/Sp/DP  Sensation intact [place]/SP/DP  Thigh compartments remain soft and compressible    Xray: Left femur in anatomic alignment   A/P: patient doing well. NWB LLE with PT in HKB locked in extension.   Vital signs stable. On telemetry

## 2020-04-02 NOTE — PROGRESS NOTES
[facility]  Orthopedics  Progress Note    Patient Name: Jonatan Reina  MRN: [id number]  Admission Date: 3/31/2020  Hospital Length of Stay: 2 days  Attending Provider: Giovanni Ware MD  Primary Care Provider: Dhaval Crowell MD  Follow-up For: Procedure(s) (LRB):  ORIF, FRACTURE, FEMUR (Left)  IRRIGATION AND DEBRIDEMENT, LOWER EXTREMITY (Left)  APPLICATION, WOUND VAC (Left)    Post-Operative Day: 1 Day Post-Op  Subjective:     Principal Problem:Displaced fracture of shaft of left femur    Principal Orthopedic Problem: left femur shaft fracture    Interval History: Pt seen and examined at bedside. He tolerated surgery well yesterday. He states that he did not get any sleep last night due to pain. His pain medications have been reviewed and appropriately changed. His dressing is c/d/i. LLE in HKB locked in extension.     Review of patient's allergies indicates:  No Known Allergies    Current Facility-Administered Medications   Medication    acetaminophen tablet 650 mg    ceFAZolin injection 2 g    celecoxib capsule 100 mg    dextrose 5 % and 0.9 % NaCl infusion    enoxaparin injection 40 mg    gabapentin capsule 100 mg    HYDROmorphone injection 0.5 mg    lidocaine HCL 10 mg/ml (1%) injection 20 mL    methocarbamoL tablet 500 mg    ondansetron disintegrating tablet 8 mg    oxyCODONE immediate release tablet 15 mg    oxyCODONE immediate release tablet 5 mg    oxyCODONE immediate release tablet Tab 10 mg    promethazine (PHENERGAN) 12.5 mg in dextrose 5 % 50 mL IVPB    sodium chloride 0.9% flush 10 mL    vancomycin 1.75 g in 5 % dextrose 500 mL IVPB    vancomycin injection 1,000 mg     Objective:     Vital Signs (Most Recent):  Temp: 97.8 °F (36.6 °C) (04/02/20 0503)  Pulse: 103 (04/02/20 0503)  Resp: 16 (04/02/20 0503)  BP: (!) 116/58 (04/02/20 0503)  SpO2: 96 % (04/02/20 0503) Vital Signs (24h Range):  Temp:  [97.3 °F (36.3 °C)-98.6 °F (37 °C)] 97.8 °F (36.6  "°C)  Pulse:  [] 103  Resp:  [10-41] 16  SpO2:  [95 %-100 %] 96 %  BP: (116-145)/(58-88) 116/58     Weight: 86.2 kg (190 lb)  Height: 5' 10" (177.8 cm)  Body mass index is 27.26 kg/m².      Intake/Output Summary (Last 24 hours) at 4/2/2020 0717  Last data filed at 4/2/2020 0506  Gross per 24 hour   Intake 3415 ml   Output 2100 ml   Net 1315 ml       Ortho/SPM Exam       Wound vac over medial distal thigh  HKB locked in extension  Surgical dressing c/d/i  Compartments soft  SILT Sa/[place]immons/DP/SP/T  Motor intact EHL/FHL/TA/Gastroc  2+ DP, 2+ PT      Significant Labs: All pertinent labs within the past 24 hours have been reviewed.    Significant Imaging: I have reviewed and interpreted all pertinent imaging results/findings.    Assessment/Plan:     * Displaced fracture of shaft of left femur  Jonatan Reina is a 29 y.o. male s/p ORIF L femur on 4/1/20  - pain control multimodal  - NWB LLE in HKB locked in extension  - wound vac in place over medial distal thigh  - PT/OT daily  - abx: vanc, ancef  - labs: Hb 9.5    Will f/u PT recs and monitor for improvement in pain control            Hernesto Rush MD  Orthopedics  [facility]  "

## 2020-04-02 NOTE — PLAN OF CARE
CM unable to reach patient on room or personal phone. Spoke with wife Delfina to complete d/c planning assessment. Patient lives in a single story home with his wife. Patient's address updated in [facility] to [address] [place] [address]. Wife will provide patient with a ride home @ discharge. Pt/Ot to eval patient today for post-acute needs. Patient was receiving Outpatient pt/ot prior to fall at [facility] in [place]. Patient unable to receive HH pt/ot as it is not a covered service under his medicaid. PCP and Pharmacy verified. Will continue to monitor for discharge needs.     04/02/20 1009   Discharge Assessment   Assessment Type Discharge Planning Assessment   Confirmed/corrected address and phone number on facesheet? Yes   Assessment information obtained from? Caregiver   Expected Length of Stay (days)   (3)   Communicated expected length of stay with patient/caregiver yes   Prior to hospitilization cognitive status: Alert/Oriented   Prior to hospitalization functional status: Assistive Equipment   Current cognitive status: Alert/Oriented   Current Functional Status: Assistive Equipment   Facility Arrived From: Home   Lives With spouse   Able to Return to Prior Arrangements yes   Is patient able to care for self after discharge? Yes   Who are your caregiver(s) and their phone number(s)?   (Delfina Reina (Spouse) [phone])   Patient's perception of discharge disposition home or selfcare;other (comments)  (Outpatient rehab)   Readmission Within the Last 30 Days no previous admission in last 30 days   Patient currently being followed by outpatient case management? No   Patient currently receives any other outside agency services? No   Equipment Currently Used at Home none   Do you have any problems affording any of your prescribed medications? No   Is the patient taking medications as prescribed? yes   Does the patient have transportation home? Yes   Transportation Anticipated family or  friend will provide   Does the patient receive services at the [facility]? No   Discharge Plan A Home with family;Other  (outpatient rehab)   Discharge Plan B Rehab   DME Needed Upon Discharge  other (see comments)  (Pending Pt/Ot recommendations)   Patient/Family in Agreement with Plan yes

## 2020-04-02 NOTE — ASSESSMENT & PLAN NOTE
Jonatan Reina is a 29 y.o. male s/p ORIF L femur on 4/1/20  - pain control multimodal  - NWB LLE in HKB locked in extension  - wound vac in place over medial distal thigh  - PT/OT daily  - abx: vanc, ancef  - labs: Hb 9.5    Will f/u PT recs and monitor for improvement in pain control

## 2020-04-02 NOTE — PT/OT/SLP EVAL
Physical Therapy Evaluation    Patient Name:  Jonatan Reina   MRN:  [id number]    Recommendations:     Discharge Recommendations:  outpatient PT   Discharge Equipment Recommendations: crutches, axillary, bedside commode   Barriers to discharge: None    Assessment:     Jonatan Reina is a 29 y.o. male admitted with a medical diagnosis of Displaced fracture of shaft of left femur.  He presents with the following impairments/functional limitations:  weakness, gait instability, impaired endurance, impaired balance, decreased lower extremity function, impaired self care skills, pain, impaired functional mobilty, orthopedic precautions. Pt tolerated session well on this date and provided x2 treatment session to address amb concerns and mobility using RW vs. Axillary Crutches. Pt demonstrates increased stability w/crutches and is safe to discharge home w/OPPT once medically stable. Pt would benefit from continued skilled acute PT Daily to improve functional mobility.  Recommending pt receive PT services in OPPT setting following discharge from hospital once medically cleared.     Rehab Prognosis: Good; patient would benefit from acute skilled PT services to address these deficits and reach maximum level of function.    Recent Surgery: Procedure(s) (LRB):  ORIF, FRACTURE, FEMUR (Left)  IRRIGATION AND DEBRIDEMENT, LOWER EXTREMITY (Left)  APPLICATION, WOUND VAC (Left) 1 Day Post-Op    Plan:     During this hospitalization, patient to be seen daily to address the identified rehab impairments via gait training, therapeutic activities, therapeutic exercises, neuromuscular re-education and progress toward the following goals:    · Plan of Care Expires:  05/02/20    Subjective     Chief Complaint: discomfort around proximal incision   Pain/Comfort:  · Pain Rating 1: 5/10  · Location - Side 1: Left  · Location 1: thigh    Patients cultural, spiritual, Rastafari conflicts given the current situation: no    Living  Environment:  Pt lives w/wife and 2 sons(4y/o and 2y/o) in 2 story home w/threshold to enter. Pt does not have to access second floor and has assistance from wife PRN.   Prior to admission, patients level of function was (I) but recently amb w/crutches d/t LLE sx.  Equipment used at home:  .  DME owned (not currently used): none.  Upon discharge, patient will have assistance from wife.    Objective:     Communicated with NSG prior to session.  Patient found supine with knee immobilizer, peripheral IV, wound vac  upon PT entry to room.    General Precautions: Standard,     Orthopedic Precautions:LLE non weight bearing   Braces: Hinged knee brace     Exams:  · Cognitive Exam:  Patient is oriented to Person, Place, Time and Situation  · Gross Motor Coordination:  WFL  · RLE ROM: WNL  · RLE Strength: WNL  · LLE ROM: Not tested d/t knee imobilizer  · LLE Strength: Not tested d/t NWB Status     Functional Mobility:  · Bed Mobility:     · Scooting: minimum assistance  · Supine to Sit: minimum assistance  · Transfers:     · Sit to Stand:  contact guard assistance with rolling walker and axillary crutches  · Bed to Chair: minimum assistance with  rolling walker  using  Step Transfer  · Gait: 1st Session: 3ft Min A w/RW difficulty maintaining NWB   2nd Session: 20ft SBA w/axillary crutches maintaining NWB and demonstrating increased safety  · Balance: Sitting: Sup.     Standing: CGA/SBA      Therapeutic Activities and Exercises:   - Sit-to-Stand x4 w/RW Min A & Crutches CGA  - Static Standing: RW- 2mins CGA     Crutches: 3mins SBA  - Pt educated on:   -PT roles, expectations, and POC    -Safety with mobility   -Benefits of OOB activities to increase strength and functional mobility    -Performing ther ex for increasing LE ROM and strength   -Discharge recommendations     AM-PAC 6 CLICK MOBILITY  Total Score:18     Patient left up in chair with all lines intact and call button in reach.    GOALS:   Multidisciplinary Problems      Physical Therapy Goals        Problem: Physical Therapy Goal    Goal Priority Disciplines Outcome Goal Variances Interventions   Physical Therapy Goal     PT, PT/OT Ongoing, Progressing     Description:  Goals to be met by: 2020    Patient will increase functional independence with mobility by performin. Supine to sit with Modified [place]  2. Sit to supine with Modified [place]  3. Sit to stand transfer with Modified [place]  5. Gait  x 50 feet with Modified [place] using LRAD and maintaining NWB status  6. Lower extremity exercise program x15 reps per handout, with independence                     History:     History reviewed. No pertinent past medical history.    Past Surgical History:   Procedure Laterality Date    APPLICATION OF WOUND VACUUM-ASSISTED CLOSURE DEVICE Left 2020    Procedure: APPLICATION, WOUND VAC;  Surgeon: Giovanni Ware MD;  Location: [facility] OR [address] [place] [address];  Service: Orthopedics;  Laterality: Left;    arm surgery      CERVICAL SPINE SURGERY      IRRIGATION AND DEBRIDEMENT OF LOWER EXTREMITY Left 2020    Procedure: IRRIGATION AND DEBRIDEMENT, LOWER EXTREMITY;  Surgeon: Giovanni Ware MD;  Location: [address];  Service: Orthopedics;  Laterality: Left;    ORIF FEMUR FRACTURE Left 3/6/2020    Procedure: ORIF, FRACTURE, FEMUR DISTAL;  Surgeon: Garry Sosa IV, MD;  Location: [facility];  Service: Orthopedics;  Laterality: Left;    Correct card and give to Rosie    ORIF FEMUR FRACTURE Left 2020    Procedure: ORIF, FRACTURE, FEMUR;  Surgeon: Goivanni Ware MD;  Location: [address];  Service: Orthopedics;  Laterality: Left;    TYMPANOSTOMY TUBE PLACEMENT           Time Tracking:     PT Received On: 20  PT Start Time: 1014(2nd session start: 12:49pm)     PT Stop Time: 1047(2nd session end: 13:16)  PT Total Time (min): 60 min     Billable Minutes: Evaluation 7, Gait Training 23, Therapeutic Activity 15 and Neuromuscular  Re-education 15      Sancho Squires, PT  04/02/2020

## 2020-04-02 NOTE — ANESTHESIA POSTPROCEDURE EVALUATION
Anesthesia Post Evaluation    Patient: Jonatan Reina    Procedure(s) Performed: Procedure(s) (LRB):  ORIF, FRACTURE, FEMUR (Left)  IRRIGATION AND DEBRIDEMENT, LOWER EXTREMITY (Left)  APPLICATION, WOUND VAC (Left)    Final Anesthesia Type: general    Patient location during evaluation: PACU  Patient participation: Yes- Able to Participate  Level of consciousness: awake and alert, awake and oriented  Post-procedure vital signs: reviewed and stable  Pain management: adequate  Airway patency: patent    PONV status at discharge: No PONV  Anesthetic complications: no      Cardiovascular status: blood pressure returned to baseline, stable and hemodynamically stable  Respiratory status: unassisted, spontaneous ventilation and room air  Hydration status: euvolemic  Follow-up not needed.          Vitals Value Taken Time   /59 4/2/2020  8:58 AM   Temp 36.9 °C (98.4 °F) 4/2/2020  8:58 AM   Pulse 93 4/2/2020  8:58 AM   Resp 18 4/2/2020  8:58 AM   SpO2 96 % 4/2/2020  8:58 AM         Event Time     Out of Recovery 04/01/2020 15:00:00          Pain/Manisha Score: Pain Rating Prior to Med Admin: 8 (4/2/2020  9:07 AM)  Pain Rating Post Med Admin: 4 (4/1/2020  4:55 PM)  Manisha Score: 10 (4/1/2020  5:15 PM)

## 2020-04-02 NOTE — PLAN OF CARE
Plan of care reviewed with pt/verbalized, vss, patient c/ of pain to left leg controlled with po and IV medications, patient on regular diet tolerating well with no c/o of nausea/vomiting, vora catheter intact draining yellow urine, call-light within reach, will continue to monitor.

## 2020-04-03 ENCOUNTER — TELEPHONE (OUTPATIENT)
Dept: ORTHOPEDICS | Facility: CLINIC | Age: 30
End: 2020-04-03

## 2020-04-03 NOTE — DISCHARGE SUMMARY
[facility]  Orthopedics  Discharge Summary      Patient Name: Jonatan Reina  MRN: [id number]  Admission Date: 3/31/2020  Hospital Length of Stay: 2 days  Discharge Date and Time: 4/2/2020  9:36 PM  Attending Physician: Giovanni Ware MD  Discharging Provider: Jermaine Gallegos MD  Primary Care Provider: Dhaval Crowell MD    HPI:  Jonatan Reina is a 29 y.o. male with Left distal femoral fracture status post ORIF 03/06/2020.  Patient fell and had dehiscence of his distal portion of his medial incision.  Fixation is still intact but he has a femoral shaft fracture above this.  Air in the knee joint on CT scan.  He was washed out in the emergency department 1 g of vancomycin powder placed in the wound with loose closure. Patient admitted on 3/31/20 for operative intervention 4/1/20.    Procedure(s) (LRB):  ORIF, FRACTURE, FEMUR (Left)  IRRIGATION AND DEBRIDEMENT, LOWER EXTREMITY (Left)  APPLICATION, WOUND VAC (Left)      Hospital Course: Patient underwent uncomplicated ORIF left femur with I&D and wound vac application. The patient stayed overnight and successfully mobilized with PT the following day.  He received antibiotics in the emergency department within a for a window of injury as well as powdered vancomycin within his wound and then formal treatment in the operating room.  He was given IV antibiotics for another 24 hr period.  The patient was discharged on POD 1 after uncomplicated hospital stay.    I had a long discussion while on the hospital bed his weight-bearing status.  He will be nonweightbearing for 10-12 weeks pending fracture healing.  He will return to clinic in 1 week for a wound check and removal of his wound VAC on the medial wound.  Sutures will remain on that side for at least 2 if not 3 weeks.    Consults (From admission, onward)        Status Ordering Provider     Inpatient consult to Orthopedic Surgery  Once     Provider:  (Not yet assigned)     Completed SHAKA ESTEBAN          Significant Diagnostic Studies: Labs:   CBC   Recent Labs   Lab 04/01/20  1453   WBC 9.14   HGB 9.5*   HCT 30.2*          Pending Diagnostic Studies:     None        Final Active Diagnoses:    Diagnosis Date Noted POA    PRINCIPAL PROBLEM:  Displaced fracture of shaft of left femur [S72.302A] 03/31/2020 Yes    Surgical wound dehiscence [T81.31XA] 04/01/2020 Yes    Open knee wound [S81.009A] 04/01/2020 Yes    Fracture [T14.8XXA] 03/31/2020 Yes      Problems Resolved During this Admission:      Discharged Condition: good    Disposition: Home or Self Care    Follow Up:  Follow-up Information     Ivanna Chapman PA-C In 1 week.    Specialty:  Orthopedic Surgery  Contact information:  [address] [place] [id number]  [phone]                 Patient Instructions:      Ambulatory referral/consult to Physical/Occupational Therapy   Standing Status: Future   Referral Priority: Routine Referral Type: Physical Medicine   Referral Reason: Specialty Services Required   Number of Visits Requested: 1     Medications:  Reconciled Home Medications:      Medication List      START taking these medications    8 HOUR PAIN RELIEVER 650 MG Tbsr  Generic drug:  acetaminophen  Take 1 tablet (650 mg total) by mouth every 6 (six) hours.     celecoxib 200 MG capsule  Commonly known as:  CeleBREX  Take 1 capsule (200 mg total) by mouth once daily.     enoxaparin 40 mg/0.4 mL Syrg  Commonly known as:  LOVENOX  Inject 0.4 mLs (40 mg total) into the skin once daily.     methocarbamoL 500 MG Tab  Commonly known as:  ROBAXIN  Take 2 tablets (1,000 mg total) by mouth 3 (three) times daily. for 14 days     oxyCODONE 5 MG immediate release tablet  Commonly known as:  ROXICODONE  Take 1 tablet (5 mg total) by mouth every 4 (four) hours as needed for Pain.     sulfamethoxazole-trimethoprim 800-160mg 800-160 mg Tab  Commonly known as:  BACTRIM DS  Take 1 tablet by mouth once daily. for 7  days        STOP taking these medications    aspirin 81 MG EC tablet  Commonly known as:  ECOTRIN     HYDROcodone-acetaminophen 7.5-325 mg per tablet  Commonly known as:  NORCO            Jermaine Gallegos MD  Orthopedics  [facility]

## 2020-04-03 NOTE — TELEPHONE ENCOUNTER
Left message for patient to return call.  Regarding schedule appointment on 4/9/20 at 9:00 am/mailed.

## 2020-04-08 ENCOUNTER — OFFICE VISIT (OUTPATIENT)
Dept: ORTHOPEDICS | Facility: CLINIC | Age: 30
End: 2020-04-08
Payer: MEDICAID

## 2020-04-08 ENCOUNTER — TELEPHONE (OUTPATIENT)
Dept: ORTHOPEDICS | Facility: CLINIC | Age: 30
End: 2020-04-08

## 2020-04-08 VITALS
HEART RATE: 103 BPM | SYSTOLIC BLOOD PRESSURE: 123 MMHG | RESPIRATION RATE: 18 BRPM | TEMPERATURE: 98 F | DIASTOLIC BLOOD PRESSURE: 74 MMHG

## 2020-04-08 DIAGNOSIS — S72.492D CLOSED COMMINUTED INTRA-ARTICULAR FRACTURE OF DISTAL END OF LEFT FEMUR WITH ROUTINE HEALING, SUBSEQUENT ENCOUNTER: Primary | ICD-10-CM

## 2020-04-08 DIAGNOSIS — T81.31XD DEHISCENCE OF OPERATIVE WOUND, SUBSEQUENT ENCOUNTER: ICD-10-CM

## 2020-04-08 DIAGNOSIS — S72.322F TYPE III OPEN DISPLACED TRANSVERSE FRACTURE OF SHAFT OF LEFT FEMUR WITH ROUTINE HEALING, SUBSEQUENT ENCOUNTER: ICD-10-CM

## 2020-04-08 PROCEDURE — 99999 PR PBB SHADOW E&M-EST. PATIENT-LVL III: ICD-10-PCS | Mod: PBBFAC,,, | Performed by: PHYSICIAN ASSISTANT

## 2020-04-08 PROCEDURE — 99999 PR PBB SHADOW E&M-EST. PATIENT-LVL III: CPT | Mod: PBBFAC,,, | Performed by: PHYSICIAN ASSISTANT

## 2020-04-08 PROCEDURE — 99024 POSTOP FOLLOW-UP VISIT: CPT | Mod: ,,, | Performed by: PHYSICIAN ASSISTANT

## 2020-04-08 PROCEDURE — 99024 PR POST-OP FOLLOW-UP VISIT: ICD-10-PCS | Mod: ,,, | Performed by: PHYSICIAN ASSISTANT

## 2020-04-08 PROCEDURE — 99213 OFFICE O/P EST LOW 20 MIN: CPT | Mod: PBBFAC | Performed by: PHYSICIAN ASSISTANT

## 2020-04-08 RX ORDER — OXYCODONE HYDROCHLORIDE 5 MG/1
5 TABLET ORAL EVERY 4 HOURS PRN
Qty: 40 TABLET | Refills: 0 | Status: SHIPPED | OUTPATIENT
Start: 2020-04-08 | End: 2020-04-15 | Stop reason: SDUPTHER

## 2020-04-08 RX ORDER — OXYCODONE HYDROCHLORIDE 5 MG/1
5 TABLET ORAL EVERY 4 HOURS PRN
Qty: 40 TABLET | Refills: 0 | Status: SHIPPED | OUTPATIENT
Start: 2020-04-08 | End: 2020-04-08 | Stop reason: SDUPTHER

## 2020-04-08 NOTE — TELEPHONE ENCOUNTER
He called  He had just seen Mrs Chapman PAC & forgot to get  His pain med refilled   I went & spoke with her   She is doing it now   I called him back with the news

## 2020-04-08 NOTE — PROGRESS NOTES
Principal Orthopedic Problem:  1.  Periprosthetic left femoral shaft fracture above prior fixation.                          2.  Surgical incision dehiscence left medial knee with air in knee joint                          3.  Tear of vastus medialis obliquus at medial patella.     Relevant Medical History: pmh of left knee distal femur fracture with ORIF with Dr. Sosa at [facility] on 3/6/20     HPI: Mr. Reina is 29 year old male who presented to the Ed on 04/01/2020 with left femur injury. Patient is s/p left knee distal femur fracture with ORIF with Dr. Sosa at [facility] on 3/6/20.  He stated that he was walking outside trying to not place his full weight on the leg when he fell and had increase in pain. He had  dehiscence of his distal portion of his medial incision. RADS: distal 1/3 femoral shaft fracture with well fixed surgical hardware in distal femur. Patient was treated with ORIF on 04/01/2020.     Mr. Reina is here today for a post-operative visit after a    1.  Open reduction internal fixation of left femoral shaft fracture  2.  Excisional and non excisional debridement of wound dehiscence left distal thigh skin and subcutaneous tissue and muscle  3.  Left knee arthrotomy with irrigation for contaminated open joint   4.  Fasciocutaneous advancement flap medial left distal thigh 20 cm  by Dr. Ware on 04/01/2020.    Interval History:  he reports that he is doing ok.   he is at home. he is not participating in PT/OT.   Pain is controlled.  he is  taking pain medication.    he denies fever, chills, and sweats since the time of the surgery.     Physical exam:  Wound vac removed.  Incision is clean, dry and intact.  Sutures left in place    RADS: All pertinent images were reviewed by myself:   none done today    Assessment:  Post-op visit ( 1 weeks)    Plan:  Current care, treatment plan, precautions, activity level/ modifications, limitations, rehabilitation exercises and proposed future  treatment were discussed with the patient. We discussed the need to monitor for changes in symptoms and condition and report them to the physician.  Discussed importance of compliance with all appointments and follow up examinations.   - he is to keep area covered.   - NWB 10-12 weeks pending fracture healing.   May begin gentle range of motion of the knee, wean from knee immobilizer   - pain medication: no refill needed,    Pain medication refill policy provided to patient for review.   - Patient is to return to clinic in 1 weeks  At time x-ray of his femur is  NOT needed  At time consider removal of sutures pending healing.      If there are any questions prior to scheduled follow up, the patient was instructed to contact the office

## 2020-04-15 ENCOUNTER — TELEPHONE (OUTPATIENT)
Dept: ORTHOPEDICS | Facility: CLINIC | Age: 30
End: 2020-04-15

## 2020-04-15 ENCOUNTER — OFFICE VISIT (OUTPATIENT)
Dept: ORTHOPEDICS | Facility: CLINIC | Age: 30
End: 2020-04-15
Payer: MEDICAID

## 2020-04-15 VITALS — TEMPERATURE: 98 F

## 2020-04-15 DIAGNOSIS — S72.322F TYPE III OPEN DISPLACED TRANSVERSE FRACTURE OF SHAFT OF LEFT FEMUR WITH ROUTINE HEALING, SUBSEQUENT ENCOUNTER: ICD-10-CM

## 2020-04-15 DIAGNOSIS — S72.492D CLOSED COMMINUTED INTRA-ARTICULAR FRACTURE OF DISTAL END OF LEFT FEMUR WITH ROUTINE HEALING, SUBSEQUENT ENCOUNTER: Primary | ICD-10-CM

## 2020-04-15 DIAGNOSIS — S72.492D CLOSED COMMINUTED INTRA-ARTICULAR FRACTURE OF DISTAL END OF LEFT FEMUR WITH ROUTINE HEALING, SUBSEQUENT ENCOUNTER: ICD-10-CM

## 2020-04-15 PROCEDURE — 99999 PR PBB SHADOW E&M-EST. PATIENT-LVL II: ICD-10-PCS | Mod: PBBFAC,,, | Performed by: PHYSICIAN ASSISTANT

## 2020-04-15 PROCEDURE — 99024 POSTOP FOLLOW-UP VISIT: CPT | Mod: ,,, | Performed by: PHYSICIAN ASSISTANT

## 2020-04-15 PROCEDURE — 99024 PR POST-OP FOLLOW-UP VISIT: ICD-10-PCS | Mod: ,,, | Performed by: PHYSICIAN ASSISTANT

## 2020-04-15 PROCEDURE — 99212 OFFICE O/P EST SF 10 MIN: CPT | Mod: PBBFAC | Performed by: PHYSICIAN ASSISTANT

## 2020-04-15 PROCEDURE — 99999 PR PBB SHADOW E&M-EST. PATIENT-LVL II: CPT | Mod: PBBFAC,,, | Performed by: PHYSICIAN ASSISTANT

## 2020-04-15 RX ORDER — OXYCODONE HYDROCHLORIDE 5 MG/1
5 TABLET ORAL EVERY 4 HOURS PRN
Qty: 40 TABLET | Refills: 0 | Status: SHIPPED | OUTPATIENT
Start: 2020-04-15 | End: 2020-05-05 | Stop reason: SDUPTHER

## 2020-04-15 RX ORDER — OXYCODONE HYDROCHLORIDE 5 MG/1
5 TABLET ORAL EVERY 4 HOURS PRN
Qty: 40 TABLET | Refills: 0 | Status: SHIPPED | OUTPATIENT
Start: 2020-04-15 | End: 2020-04-15 | Stop reason: SDUPTHER

## 2020-04-15 NOTE — TELEPHONE ENCOUNTER
----- Message from Olena Lo sent at 4/15/2020 10:59 AM CDT -----  Contact: PT Jonatan Reina MRN# [id number]  88 @#   PT Jonatan Reina MRN# [id number]  88 @#     Patient needs script transferred to [facility] on [facility] the [facility] to does not have the medication in stock they are out.    Patient can be contacted PT Jonatan Reina MRN# [id number]  88 @# [phone]

## 2020-04-15 NOTE — PROGRESS NOTES
Principal Orthopedic Problem:  1.  Periprosthetic left femoral shaft fracture above prior fixation.                          2.  Surgical incision dehiscence left medial knee with air in knee joint                          3.  Tear of vastus medialis obliquus at medial patella.     Relevant Medical History: pmh of left knee distal femur fracture with ORIF with Dr. Sosa at [facility] on 3/6/20     HPI: Mr. Reina is 29 year old male who presented to the Ed on 04/01/2020 with left femur injury. Patient is s/p left knee distal femur fracture with ORIF with Dr. Sosa at [facility] on 3/6/20.  He stated that he was walking outside trying to not place his full weight on the leg when he fell and had increase in pain. He had  dehiscence of his distal portion of his medial incision. RADS: distal 1/3 femoral shaft fracture with well fixed surgical hardware in distal femur. Patient was treated with ORIF on 04/01/2020.     Mr. Reina is here today for a post-operative visit after a    1.  Open reduction internal fixation of left femoral shaft fracture  2.  Excisional and non excisional debridement of wound dehiscence left distal thigh skin and subcutaneous tissue and muscle  3.  Left knee arthrotomy with irrigation for contaminated open joint   4.  Fasciocutaneous advancement flap medial left distal thigh 20 cm  by Dr. Ware on 04/01/2020.    Interval History:  he reports that he is doing ok.   he is at home. he is not participating in PT/OT.  Has appointment tomorrow to begin.   Pain is controlled.  he is taking pain medication.    he denies fever, chills, and sweats since the time of the surgery.     Physical exam:  Wound vac removed.  Incision is clean, dry and intact.  Sutures removed without difficulty    RADS: All pertinent images were reviewed by myself:   none done today    Assessment:  Post-op visit ( 2 weeks)    Plan:  Current care, treatment plan, precautions, activity level/ modifications,  limitations, rehabilitation exercises and proposed future treatment were discussed with the patient. We discussed the need to monitor for changes in symptoms and condition and report them to the physician.  Discussed importance of compliance with all appointments and follow up examinations.   - The patient was advised to keep the incision clean and dry for the next 24 hours after which he may wash the area with antibacterial soap in the shower. Will not submerge until the incision is completely healed  -Patient was advised to monitor wound closely and multiple times daily for any problems. Call clinic immediately or report to ED for immediate medical attention for any complications including reopening of wound, drainage, purulence, redness, streaking, odor, pain out of proportion, fever, chills, etc.   - NWB 10-12 weeks pending fracture healing.   May begin gentle range of motion of the knee, wean from knee immobilizer   - pain medication: refill needed,    Pain medication refill policy provided to patient for review.   - Patient is to return to clinic in 4 weeks  At time x-ray of his femur is  needed     If there are any questions prior to scheduled follow up, the patient was instructed to contact the office

## 2020-04-18 ENCOUNTER — NURSE TRIAGE (OUTPATIENT)
Dept: ADMINISTRATIVE | Facility: CLINIC | Age: 30
End: 2020-04-18

## 2020-04-18 NOTE — TELEPHONE ENCOUNTER
Pt saw Md on wed post op surgery 2 weeks now and got 40 pain pills filled on 4/15. He said that pain was bad so had to take sometimes 3 pills. Pt wants to know if there is anything else they can prescibe for him. Told that I can route message to MD and that we dont do narcotics on the weekend and if pain too bad needs to go to the ED.          Reason for Disposition   New or worsening leg (calf, thigh) pain   Followed a leg injury   Surgical incision symptoms and questions   [1] Post-op pain AND [2] not controlled with pain medications    Additional Information   Negative: Sounds like a life-threatening emergency to the triager   Negative: Looks like a broken bone or dislocated joint (e.g., crooked or deformed)   Negative: Sounds like a life-threatening emergency to the triager   Negative: Serious injury with multiple fractures   Negative: [1] Major bleeding (e.g., actively dripping or spurting) AND [2] can't be stopped   Negative: Bullet wound, stabbed by knife, or other serious penetrating wound   Negative: Looks like a dislocated joint (crooked or deformed)   Negative: Can't stand (bear weight) or walk   Negative: Sounds like a life-threatening emergency to the triager   Negative: [1] Major abdominal surgical incision AND [2] wound gaping open AND [3] visible internal organs   Negative: Sounds like a life-threatening emergency to the triager   Negative: [1] Bleeding from incision AND [2] won't stop after 10 minutes of direct pressure   Negative: [1] Widespread rash AND [2] bright red, sunburn-like   Negative: Fever > 100.5 F (38.1 C)   Negative: [1] Incision looks infected (spreading redness, pain) AND [2] fever > 99.5 F (37.5 C)   Negative: [1] Incision looks infected (spreading redness, pain) AND [2] large red area (> 2 in. or 5 cm)   Negative: [1] Incision looks infected (spreading redness, pain) AND [2] face wound   Negative: [1] Red streak runs from the incision AND [2] longer than 1  inch (2.5 cm)   Negative: [1] Pus or bad-smelling fluid draining from incision AND [2] no fever   Negative: Severe pain in the incision   Negative: [1] Suture came out early AND [2] wound gaping AND [3] < 48 hours since sutures placed   Negative: [1] Incision gaping open AND [2] length of opening > 2 inches (5 cm)   Negative: Patient sounds very sick or weak to the triager   Negative: Sounds like a serious complication to the triager    Protocols used: POST-OP SYMPTOMS AND DLFIEDQUP-L-BH, LEG PAIN-A-AH, POST-OP INCISION SYMPTOMS-A-AH, LEG INJURY-A-AH

## 2020-04-20 DIAGNOSIS — S72.492D CLOSED COMMINUTED INTRA-ARTICULAR FRACTURE OF DISTAL END OF LEFT FEMUR WITH ROUTINE HEALING, SUBSEQUENT ENCOUNTER: Primary | ICD-10-CM

## 2020-04-20 DIAGNOSIS — S72.322F TYPE III OPEN DISPLACED TRANSVERSE FRACTURE OF SHAFT OF LEFT FEMUR WITH ROUTINE HEALING, SUBSEQUENT ENCOUNTER: ICD-10-CM

## 2020-04-20 RX ORDER — CELECOXIB 200 MG/1
200 CAPSULE ORAL DAILY
Qty: 30 CAPSULE | Refills: 0 | Status: SHIPPED | OUTPATIENT
Start: 2020-04-20 | End: 2020-05-08

## 2020-04-20 RX ORDER — HYDROCODONE BITARTRATE AND ACETAMINOPHEN 10; 325 MG/1; MG/1
1 TABLET ORAL
Qty: 42 TABLET | Refills: 0 | Status: SHIPPED | OUTPATIENT
Start: 2020-04-20 | End: 2020-04-27 | Stop reason: SDUPTHER

## 2020-04-20 NOTE — PROGRESS NOTES
Reports that after beginning PT he noticed an increase in pain. He had to increase the pain medication to at times 3 pills at a time. He reports that the pain medication seemed to not be working as well. His pain has decreased. We will change his pain medication to Norco 10 along with Celebrex. He will call if regime is not helping.

## 2020-04-25 RX ORDER — ONDANSETRON HYDROCHLORIDE 8 MG/1
8 TABLET, FILM COATED ORAL EVERY 8 HOURS PRN
Qty: 50 TABLET | Refills: 0 | Status: SHIPPED | OUTPATIENT
Start: 2020-04-25 | End: 2020-04-27 | Stop reason: SDUPTHER

## 2020-04-27 ENCOUNTER — DOCUMENTATION ONLY (OUTPATIENT)
Dept: ORTHOPEDICS | Facility: CLINIC | Age: 30
End: 2020-04-27

## 2020-04-27 DIAGNOSIS — S72.492D CLOSED COMMINUTED INTRA-ARTICULAR FRACTURE OF DISTAL END OF LEFT FEMUR WITH ROUTINE HEALING, SUBSEQUENT ENCOUNTER: ICD-10-CM

## 2020-04-27 DIAGNOSIS — S72.322F TYPE III OPEN DISPLACED TRANSVERSE FRACTURE OF SHAFT OF LEFT FEMUR WITH ROUTINE HEALING, SUBSEQUENT ENCOUNTER: ICD-10-CM

## 2020-04-27 RX ORDER — ONDANSETRON HYDROCHLORIDE 8 MG/1
8 TABLET, FILM COATED ORAL EVERY 8 HOURS PRN
Qty: 50 TABLET | Refills: 0 | Status: SHIPPED | OUTPATIENT
Start: 2020-04-27 | End: 2020-05-18 | Stop reason: SDUPTHER

## 2020-04-27 RX ORDER — HYDROCODONE BITARTRATE AND ACETAMINOPHEN 10; 325 MG/1; MG/1
1 TABLET ORAL
Qty: 42 TABLET | Refills: 0 | Status: SHIPPED | OUTPATIENT
Start: 2020-04-27 | End: 2020-05-01 | Stop reason: SDUPTHER

## 2020-04-27 NOTE — PROGRESS NOTES
Spoke with Dolores at [facility] [phone]     Phoned in script for Zofran 8 mg # 50  No refills per Ivanna Chapman PA-C

## 2020-05-01 DIAGNOSIS — S72.322F TYPE III OPEN DISPLACED TRANSVERSE FRACTURE OF SHAFT OF LEFT FEMUR WITH ROUTINE HEALING, SUBSEQUENT ENCOUNTER: ICD-10-CM

## 2020-05-01 DIAGNOSIS — S72.492D CLOSED COMMINUTED INTRA-ARTICULAR FRACTURE OF DISTAL END OF LEFT FEMUR WITH ROUTINE HEALING, SUBSEQUENT ENCOUNTER: ICD-10-CM

## 2020-05-01 RX ORDER — HYDROCODONE BITARTRATE AND ACETAMINOPHEN 10; 325 MG/1; MG/1
1 TABLET ORAL
Qty: 42 TABLET | Refills: 0 | Status: SHIPPED | OUTPATIENT
Start: 2020-05-01 | End: 2020-05-12 | Stop reason: SDUPTHER

## 2020-05-05 ENCOUNTER — TELEPHONE (OUTPATIENT)
Dept: ORTHOPEDICS | Facility: CLINIC | Age: 30
End: 2020-05-05

## 2020-05-05 DIAGNOSIS — S72.492D CLOSED COMMINUTED INTRA-ARTICULAR FRACTURE OF DISTAL END OF LEFT FEMUR WITH ROUTINE HEALING, SUBSEQUENT ENCOUNTER: ICD-10-CM

## 2020-05-05 DIAGNOSIS — S72.322F TYPE III OPEN DISPLACED TRANSVERSE FRACTURE OF SHAFT OF LEFT FEMUR WITH ROUTINE HEALING, SUBSEQUENT ENCOUNTER: ICD-10-CM

## 2020-05-05 RX ORDER — OXYCODONE HYDROCHLORIDE 5 MG/1
5 TABLET ORAL EVERY 4 HOURS PRN
Qty: 40 TABLET | Refills: 0 | Status: SHIPPED | OUTPATIENT
Start: 2020-05-05 | End: 2021-01-28

## 2020-05-05 NOTE — TELEPHONE ENCOUNTER
Spoke with the patient. He has noticed some white dissolvable sutures presenting along the suture line.   Also he has taken Norco 10 1 Po at times 2 at a time. Discussed not to do this due to increase in Tylenol.     ----- Message from Adriana Marsh MA sent at 5/5/2020  1:02 PM CDT -----  Contact: self  Spoke with pt. Pt is marylou 5/8/20. Pt is asking for a refill.  Pt stated that he have taken two hydrocodone at a time of his pain meds.; due to the physical therapy.  ----- Message -----  From: Uli Aquino  Sent: 5/5/2020  12:11 PM CDT  To: Ari Goncalves Staff    Pt states he feels something like cloth coming from his surger site and confused as to what it is Pt ask for a call    Contact info   [phone]

## 2020-05-05 NOTE — TELEPHONE ENCOUNTER
----- Message from Fabricio Rosenberg sent at 5/5/2020  2:15 PM CDT -----  Contact: self   Pt is asking for his prescription to be sent to the main campus pharmacy     oxyCODONE (ROXICODONE) 5 MG immediate release tablet 40 tablet       Contact info- [phone]

## 2020-05-06 ENCOUNTER — TELEPHONE (OUTPATIENT)
Dept: ORTHOPEDICS | Facility: CLINIC | Age: 30
End: 2020-05-06

## 2020-05-06 DIAGNOSIS — S72.492D CLOSED COMMINUTED INTRA-ARTICULAR FRACTURE OF DISTAL END OF LEFT FEMUR WITH ROUTINE HEALING, SUBSEQUENT ENCOUNTER: Primary | ICD-10-CM

## 2020-05-08 ENCOUNTER — OFFICE VISIT (OUTPATIENT)
Dept: ORTHOPEDICS | Facility: CLINIC | Age: 30
End: 2020-05-08
Payer: MEDICAID

## 2020-05-08 ENCOUNTER — TELEPHONE (OUTPATIENT)
Dept: ORTHOPEDICS | Facility: CLINIC | Age: 30
End: 2020-05-08

## 2020-05-08 ENCOUNTER — HOSPITAL ENCOUNTER (OUTPATIENT)
Dept: RADIOLOGY | Facility: HOSPITAL | Age: 30
Discharge: HOME | End: 2020-05-08
Attending: PHYSICIAN ASSISTANT
Payer: MEDICAID

## 2020-05-08 VITALS — TEMPERATURE: 98 F

## 2020-05-08 DIAGNOSIS — R52 PAIN: ICD-10-CM

## 2020-05-08 DIAGNOSIS — R52 PAIN: Primary | ICD-10-CM

## 2020-05-08 PROCEDURE — 99999 PR PBB SHADOW E&M-EST. PATIENT-LVL III: ICD-10-PCS | Mod: PBBFAC,,, | Performed by: PHYSICIAN ASSISTANT

## 2020-05-08 PROCEDURE — 73552 X-RAY EXAM OF FEMUR 2/>: CPT | Mod: TC,LT

## 2020-05-08 PROCEDURE — 73552 X-RAY EXAM OF FEMUR 2/>: CPT | Mod: 26,LT,, | Performed by: RADIOLOGY

## 2020-05-08 PROCEDURE — 99024 PR POST-OP FOLLOW-UP VISIT: ICD-10-PCS | Mod: ,,, | Performed by: PHYSICIAN ASSISTANT

## 2020-05-08 PROCEDURE — 99999 PR PBB SHADOW E&M-EST. PATIENT-LVL III: CPT | Mod: PBBFAC,,, | Performed by: PHYSICIAN ASSISTANT

## 2020-05-08 PROCEDURE — 99024 POSTOP FOLLOW-UP VISIT: CPT | Mod: ,,, | Performed by: PHYSICIAN ASSISTANT

## 2020-05-08 PROCEDURE — 73552 XR FEMUR 2 VIEW LEFT: ICD-10-PCS | Mod: 26,LT,, | Performed by: RADIOLOGY

## 2020-05-08 PROCEDURE — 99213 OFFICE O/P EST LOW 20 MIN: CPT | Mod: PBBFAC,25 | Performed by: PHYSICIAN ASSISTANT

## 2020-05-08 RX ORDER — MELOXICAM 15 MG/1
15 TABLET ORAL DAILY
Qty: 30 TABLET | Refills: 0 | Status: SHIPPED | OUTPATIENT
Start: 2020-05-08 | End: 2020-06-15

## 2020-05-08 NOTE — TELEPHONE ENCOUNTER
----- Message from Fabricio Rosenberg sent at 5/8/2020 12:23 PM CDT -----  Contact: [facility]- elmer Sutton is asking for a call back in regards to the pt weight baring status.     contact info- [phone]

## 2020-05-08 NOTE — PROGRESS NOTES
Principal Orthopedic Problem:  1.  Periprosthetic left femoral shaft fracture above prior fixation.                          2.  Surgical incision dehiscence left medial knee with air in knee joint                          3.  Tear of vastus medialis obliquus at medial patella.     Relevant Medical History: pmh of left knee distal femur fracture with ORIF with Dr. Sosa at [facility] on 3/6/20     HPI: Mr. Reina is 29 year old male who presented to the Ed on 04/01/2020 with left femur injury. Patient is s/p left knee distal femur fracture with ORIF with Dr. Sosa at [facility] on 3/6/20.  He stated that he was walking outside trying to not place his full weight on the leg when he fell and had increase in pain. He had  dehiscence of his distal portion of his medial incision. RADS: distal 1/3 femoral shaft fracture with well fixed surgical hardware in distal femur. Patient was treated with ORIF on 04/01/2020.     Mr. Reina is here today for a post-operative visit after a    1.  Open reduction internal fixation of left femoral shaft fracture  2.  Excisional and non excisional debridement of wound dehiscence left distal thigh skin and subcutaneous tissue and muscle  3.  Left knee arthrotomy with irrigation for contaminated open joint   4.  Fasciocutaneous advancement flap medial left distal thigh 20 cm  by Dr. Ware on 04/01/2020.    Interval History:  he reports that he is doing ok.   he is at home. he is participating in PT/OT.    Pain is controlled.  he is taking pain medication.    he denies fever, chills, and sweats since the time of the surgery.     Physical exam:  Arrived to clinic with crutches. NWB.   Incision is clean, dry and intact.  Some dissolvable sutures noted along incision. range of motion 5-70, reports gets to 80 in physical therapy.     RADS: All pertinent images were reviewed by myself:   No new fractures/dislocations.  Hardware in place with callous formation.  No signs of  hardware loosening/failure      Assessment:  Post-op visit (6 weeks)    Plan:  Current care, treatment plan, precautions, activity level/ modifications, limitations, rehabilitation exercises and proposed future treatment were discussed with the patient. We discussed the need to monitor for changes in symptoms and condition and report them to the physician.  Discussed importance of compliance with all appointments and follow up examinations.   - range of motion as tolerated  - NWB 10-12 weeks pending fracture healing.   - pain medication: refill needed, no   Pain medication refill policy provided to patient for review.   - Patient is to return to clinic in 4 weeks  At time x-ray of his femur is  Needed  At that time consider advance weightbearing.      If there are any questions prior to scheduled follow up, the patient was instructed to contact the office

## 2020-05-11 ENCOUNTER — NURSE TRIAGE (OUTPATIENT)
Dept: ADMINISTRATIVE | Facility: CLINIC | Age: 30
End: 2020-05-11

## 2020-05-11 DIAGNOSIS — S72.322F TYPE III OPEN DISPLACED TRANSVERSE FRACTURE OF SHAFT OF LEFT FEMUR WITH ROUTINE HEALING, SUBSEQUENT ENCOUNTER: ICD-10-CM

## 2020-05-11 DIAGNOSIS — S72.492D CLOSED COMMINUTED INTRA-ARTICULAR FRACTURE OF DISTAL END OF LEFT FEMUR WITH ROUTINE HEALING, SUBSEQUENT ENCOUNTER: ICD-10-CM

## 2020-05-11 RX ORDER — HYDROCODONE BITARTRATE AND ACETAMINOPHEN 10; 325 MG/1; MG/1
1 TABLET ORAL
Qty: 42 TABLET | Refills: 0 | OUTPATIENT
Start: 2020-05-11

## 2020-05-12 ENCOUNTER — TELEPHONE (OUTPATIENT)
Dept: ORTHOPEDICS | Facility: CLINIC | Age: 30
End: 2020-05-12

## 2020-05-12 DIAGNOSIS — S72.492D CLOSED COMMINUTED INTRA-ARTICULAR FRACTURE OF DISTAL END OF LEFT FEMUR WITH ROUTINE HEALING, SUBSEQUENT ENCOUNTER: ICD-10-CM

## 2020-05-12 DIAGNOSIS — S72.322F TYPE III OPEN DISPLACED TRANSVERSE FRACTURE OF SHAFT OF LEFT FEMUR WITH ROUTINE HEALING, SUBSEQUENT ENCOUNTER: ICD-10-CM

## 2020-05-12 RX ORDER — HYDROCODONE BITARTRATE AND ACETAMINOPHEN 10; 325 MG/1; MG/1
1 TABLET ORAL
Qty: 42 TABLET | Refills: 0 | Status: SHIPPED | OUTPATIENT
Start: 2020-05-12 | End: 2020-05-18 | Stop reason: SDUPTHER

## 2020-05-12 NOTE — TELEPHONE ENCOUNTER
Ivanna Chapman PA-C and I contacted the pharmacist.  Advised that pt received Roxicodone last on 5/5/2020   Pt will not be receiving any additional scripts of Roxicodone    Pt is currently only taking Norco for pain

## 2020-05-12 NOTE — TELEPHONE ENCOUNTER
----- Message from Lidya Brenner sent at 5/12/2020  9:21 AM CDT -----  Contact: pharmacy  Urgent  KENNEDI    Natalia    [phone]    Pharmacy received a script for both  Pain meds       Please call to confirm    Which one ??  Or both?     oxyCODONE (ROXICODONE) 5 MG immediate release tablet    HYDROcodone-acetaminophen (NORCO)  mg per tablet   Please call  With instructions     Thanks

## 2020-05-18 DIAGNOSIS — S72.322F TYPE III OPEN DISPLACED TRANSVERSE FRACTURE OF SHAFT OF LEFT FEMUR WITH ROUTINE HEALING, SUBSEQUENT ENCOUNTER: ICD-10-CM

## 2020-05-18 DIAGNOSIS — S72.492D CLOSED COMMINUTED INTRA-ARTICULAR FRACTURE OF DISTAL END OF LEFT FEMUR WITH ROUTINE HEALING, SUBSEQUENT ENCOUNTER: ICD-10-CM

## 2020-05-18 RX ORDER — ONDANSETRON HYDROCHLORIDE 8 MG/1
8 TABLET, FILM COATED ORAL EVERY 8 HOURS PRN
Qty: 50 TABLET | Refills: 0 | Status: SHIPPED | OUTPATIENT
Start: 2020-05-18 | End: 2020-05-18 | Stop reason: SDUPTHER

## 2020-05-18 RX ORDER — HYDROCODONE BITARTRATE AND ACETAMINOPHEN 10; 325 MG/1; MG/1
1 TABLET ORAL
Qty: 42 TABLET | Refills: 0 | Status: SHIPPED | OUTPATIENT
Start: 2020-05-18 | End: 2020-05-25 | Stop reason: SDUPTHER

## 2020-05-18 RX ORDER — ONDANSETRON HYDROCHLORIDE 8 MG/1
8 TABLET, FILM COATED ORAL EVERY 8 HOURS PRN
Qty: 50 TABLET | Refills: 0 | Status: SHIPPED | OUTPATIENT
Start: 2020-05-18 | End: 2020-05-25 | Stop reason: SDUPTHER

## 2020-05-25 DIAGNOSIS — S72.322F TYPE III OPEN DISPLACED TRANSVERSE FRACTURE OF SHAFT OF LEFT FEMUR WITH ROUTINE HEALING, SUBSEQUENT ENCOUNTER: ICD-10-CM

## 2020-05-25 DIAGNOSIS — S72.492D CLOSED COMMINUTED INTRA-ARTICULAR FRACTURE OF DISTAL END OF LEFT FEMUR WITH ROUTINE HEALING, SUBSEQUENT ENCOUNTER: ICD-10-CM

## 2020-05-25 RX ORDER — ONDANSETRON HYDROCHLORIDE 8 MG/1
8 TABLET, FILM COATED ORAL EVERY 8 HOURS PRN
Qty: 50 TABLET | Refills: 0 | Status: SHIPPED | OUTPATIENT
Start: 2020-05-25 | End: 2020-06-02 | Stop reason: SDUPTHER

## 2020-05-25 RX ORDER — ONDANSETRON HYDROCHLORIDE 8 MG/1
8 TABLET, FILM COATED ORAL EVERY 8 HOURS PRN
Qty: 50 TABLET | Refills: 0 | Status: SHIPPED | OUTPATIENT
Start: 2020-05-25 | End: 2020-05-25 | Stop reason: SDUPTHER

## 2020-05-25 RX ORDER — HYDROCODONE BITARTRATE AND ACETAMINOPHEN 10; 325 MG/1; MG/1
1 TABLET ORAL
Qty: 42 TABLET | Refills: 0 | Status: SHIPPED | OUTPATIENT
Start: 2020-05-25 | End: 2020-06-01 | Stop reason: SDUPTHER

## 2020-05-25 RX ORDER — HYDROCODONE BITARTRATE AND ACETAMINOPHEN 10; 325 MG/1; MG/1
1 TABLET ORAL
Qty: 42 TABLET | Refills: 0 | Status: SHIPPED | OUTPATIENT
Start: 2020-05-25 | End: 2020-05-25 | Stop reason: SDUPTHER

## 2020-05-25 NOTE — PROGRESS NOTES
Called and cancelled medication sent to [facility] due to pharmacy being closed today. Sent in prescription to [facility] as requested.

## 2020-05-26 ENCOUNTER — TELEPHONE (OUTPATIENT)
Dept: ORTHOPEDICS | Facility: CLINIC | Age: 30
End: 2020-05-26

## 2020-05-26 NOTE — TELEPHONE ENCOUNTER
----- Message from Mohamud Holden sent at 5/26/2020  9:51 AM CDT -----  Contact: Laisha/Chandu  Please call Laisha at [phone]    Fax# [phone]    Detailed written orders will be faxed again today    Please sign and return    Thank you

## 2020-05-29 RX ORDER — HYDROCODONE BITARTRATE AND ACETAMINOPHEN 7.5; 325 MG/1; MG/1
1 TABLET ORAL EVERY 6 HOURS PRN
Qty: 40 TABLET | Refills: 0 | Status: CANCELLED | OUTPATIENT
Start: 2020-05-29

## 2020-06-01 ENCOUNTER — NURSE TRIAGE (OUTPATIENT)
Dept: ADMINISTRATIVE | Facility: CLINIC | Age: 30
End: 2020-06-01

## 2020-06-01 ENCOUNTER — TELEPHONE (OUTPATIENT)
Dept: ORTHOPEDICS | Facility: CLINIC | Age: 30
End: 2020-06-01

## 2020-06-01 DIAGNOSIS — S72.492D CLOSED COMMINUTED INTRA-ARTICULAR FRACTURE OF DISTAL END OF LEFT FEMUR WITH ROUTINE HEALING, SUBSEQUENT ENCOUNTER: ICD-10-CM

## 2020-06-01 DIAGNOSIS — S72.322F TYPE III OPEN DISPLACED TRANSVERSE FRACTURE OF SHAFT OF LEFT FEMUR WITH ROUTINE HEALING, SUBSEQUENT ENCOUNTER: ICD-10-CM

## 2020-06-01 RX ORDER — HYDROCODONE BITARTRATE AND ACETAMINOPHEN 10; 325 MG/1; MG/1
1 TABLET ORAL
Qty: 42 TABLET | Refills: 0 | Status: SHIPPED | OUTPATIENT
Start: 2020-06-01 | End: 2020-06-08 | Stop reason: SDUPTHER

## 2020-06-01 NOTE — TELEPHONE ENCOUNTER
----- Message from Ivanna Chapman PA-C sent at 6/1/2020  9:10 AM CDT -----  Contact: self   Refill sent into the pharmacy. Please review pain medication policy with the patient and I have placed a referral to pain manage and PMR to work on establishing care now because I can only write pain medication for another 3 weeks.   Ivanna     ----- Message -----  From: Adriana Marsh MA  Sent: 6/1/2020   9:04 AM CDT  To: Ivanna Chapman PA-C        ----- Message -----  From: Fabricio Rosenberg  Sent: 6/1/2020   8:59 AM CDT  To: Ari Goncalves Staff    Pt is asking for a refill on his medication    HYDROcodone-acetaminophen (NORCO)  mg per tablet 42 tablet     [facility] [place] \    Contact info- [phone]

## 2020-06-01 NOTE — TELEPHONE ENCOUNTER
Spoke with pt. Pt stated that [facility] does not accept his health insurance. I explain to pt that he would have to call the number on the back of his insurance card. For a pain specialist. Patient states verbal understanding and has no further questions.

## 2020-06-01 NOTE — TELEPHONE ENCOUNTER
Spoke with pt that his Rx was sent. Advise [facility] on [facility] on refills for pain medication. Pt was given information:  [facility]  Pain Management  Address: [address] [place], [place] [address] [address], [place] [address]  Phone: [phone]  Patient states verbal understanding and has no further questions.

## 2020-06-01 NOTE — TELEPHONE ENCOUNTER
Caller stated that he needed a refill of zofran, when told that I would sent message to PA, pt stated that he just wanted to call and preferred for me not send message.     Reason for Disposition   Caller has cancelled the call before the first contact    Additional Information   Negative: Patient already left for the hospital/clinic.   Negative: Pager number given.  Answering service notified.   Negative: Cell phone out of range.  Phone number verified.   Negative: Wrong number reached.  Phone number verified.   Negative: Second attempt to contact family AND no contact made.  Phone number verified.   Negative: Message left with person in household.   Negative: Message left on unidentified voice mail.  Phone number verified.   Negative: Message left on identified voice mail   Negative: Busy signal.  First attempt to contact caller.  Follow-up call scheduled within 15 minutes.   Negative: No answer.  First attempt to contact caller.  Follow-up call scheduled within 15 minutes.   Negative: Caller has already spoken with the PCP and has no further questions.   Negative: Caller has already spoken with another triager and has no further questions.   Negative: Caller has already spoken with another triager or PCP AND has further questions AND triager able to answer questions.   Negative: Caller is angry or rude (e.g., hangs up, verbally abusive, yelling)   Negative: Caller hangs up    Protocols used: NO CONTACT OR DUPLICATE CONTACT CALL-A-

## 2020-06-02 ENCOUNTER — TELEPHONE (OUTPATIENT)
Dept: ORTHOPEDICS | Facility: CLINIC | Age: 30
End: 2020-06-02

## 2020-06-02 DIAGNOSIS — S72.322F TYPE III OPEN DISPLACED TRANSVERSE FRACTURE OF SHAFT OF LEFT FEMUR WITH ROUTINE HEALING, SUBSEQUENT ENCOUNTER: ICD-10-CM

## 2020-06-02 DIAGNOSIS — S72.492D CLOSED COMMINUTED INTRA-ARTICULAR FRACTURE OF DISTAL END OF LEFT FEMUR WITH ROUTINE HEALING, SUBSEQUENT ENCOUNTER: ICD-10-CM

## 2020-06-02 DIAGNOSIS — S72.322F TYPE III OPEN DISPLACED TRANSVERSE FRACTURE OF SHAFT OF LEFT FEMUR WITH ROUTINE HEALING, SUBSEQUENT ENCOUNTER: Primary | ICD-10-CM

## 2020-06-02 RX ORDER — ONDANSETRON HYDROCHLORIDE 8 MG/1
8 TABLET, FILM COATED ORAL EVERY 8 HOURS PRN
Qty: 50 TABLET | Refills: 0 | Status: SHIPPED | OUTPATIENT
Start: 2020-06-02 | End: 2021-01-28

## 2020-06-02 NOTE — TELEPHONE ENCOUNTER
----- Message from Ivanna Chapman PA-C sent at 6/2/2020  6:32 AM CDT -----  Contact: Pt   Sent into the pharmacy yesterday  ----- Message -----  From: Adriana Marsh MA  Sent: 6/1/2020   3:40 PM CDT  To: Ivanna Chapman PA-C        ----- Message -----  From: Traci Camacho  Sent: 6/1/2020   3:12 PM CDT  To: Ari Goncalves Staff    Pt would like to receive a call back regarding a refill for ondansetron (ZOFRAN) 8 MG tablet. Please contact the pt to advise.    Contact info [phone]    [facility] #[id number] - [place] - [phone]

## 2020-06-02 NOTE — TELEPHONE ENCOUNTER
Spoke with pt that his Rx for Zofran was sent. Patient states verbal understanding and has no further questions.

## 2020-06-04 NOTE — PROGRESS NOTES
Principal Orthopedic Problem:  1.  Periprosthetic left femoral shaft fracture above prior fixation.                          2.  Surgical incision dehiscence left medial knee with air in knee joint                          3.  Tear of vastus medialis obliquus at medial patella.     Relevant Medical History: pmh of left knee distal femur fracture with ORIF with Dr. Sosa at [facility] on 3/6/20     HPI: Mr. Reina is 29 year old male who presented to the ED on 04/01/2020 with left femur injury. Patient is s/p left knee distal femur fracture with ORIF with Dr. Sosa at [facility] on 3/6/20.  He stated that he was walking outside trying to not place his full weight on the leg when he fell and had increase in pain. He had  dehiscence of his distal portion of his medial incision. RADS: distal 1/3 femoral shaft fracture with well fixed surgical hardware in distal femur. Patient was treated with ORIF on 04/01/2020.     Mr. Reina is here today for a post-operative visit after a    1.  Open reduction internal fixation of left femoral shaft fracture  2.  Excisional and non excisional debridement of wound dehiscence left distal thigh skin and subcutaneous tissue and muscle  3.  Left knee arthrotomy with irrigation for contaminated open joint   4.  Fasciocutaneous advancement flap medial left distal thigh 20 cm  by Dr. Ware on 04/01/2020.    Interval History:  he reports that he is doing ok.   he is at home. he is participating in PT/OT.    Pain is controlled.  he is taking pain medication.  Norco 10. He is unsure how many a day or how often he is taking because he takes when it hurts.   he denies fever, chills, and sweats since the time of the surgery.     Physical exam:  Arrived to clinic with crutches. NWB in wheelchair.   Incision is clean, dry and intact.  range of motion 0-130,      RADS: All pertinent images were reviewed by myself:   No new fractures/dislocations.  Hardware in place with callous  formation.  No signs of hardware loosening/failure    Assessment:  Post-op visit (9 weeks)    Plan:  Current care, treatment plan, precautions, activity level/ modifications, limitations, rehabilitation exercises and proposed future treatment were discussed with the patient. We discussed the need to monitor for changes in symptoms and condition and report them to the physician.  Discussed importance of compliance with all appointments and follow up examinations.   -PT/OT, [facility],  Patient is responsible to establish and continue care   - range of motion as tolerated   - NWB 10-12 weeks pending fracture healing. Discussed slowly  advancing next week on 06/12/2020. He is to not do anything that increases his pain.   - pain medication: refill needed, no   Pain medication refill policy provided to patient for review.     -Discussed with him that he will only be able to recieve 2 more refills then he must be completely off of pain medication. He will work on cutting his Norco 10 in 1/2 and see if he gets the same relief. He this that he will be able to do this because he recently got an epidural injection in his back and his pain has decreased. He will also use Mobic as well as ice and elevation for achy throbbing pain and swelling.   - Patient is to return to clinic in 6 weeks with   At time x-ray of his femur is needed     If there are any questions prior to scheduled follow up, the patient was instructed to contact the office

## 2020-06-05 ENCOUNTER — HOSPITAL ENCOUNTER (OUTPATIENT)
Dept: RADIOLOGY | Facility: HOSPITAL | Age: 30
Discharge: HOME | End: 2020-06-05
Attending: PHYSICIAN ASSISTANT
Payer: MEDICAID

## 2020-06-05 ENCOUNTER — OFFICE VISIT (OUTPATIENT)
Dept: ORTHOPEDICS | Facility: CLINIC | Age: 30
End: 2020-06-05
Payer: MEDICAID

## 2020-06-05 VITALS — TEMPERATURE: 98 F

## 2020-06-05 DIAGNOSIS — S72.492D CLOSED COMMINUTED INTRA-ARTICULAR FRACTURE OF DISTAL END OF LEFT FEMUR WITH ROUTINE HEALING, SUBSEQUENT ENCOUNTER: ICD-10-CM

## 2020-06-05 DIAGNOSIS — S72.322F TYPE III OPEN DISPLACED TRANSVERSE FRACTURE OF SHAFT OF LEFT FEMUR WITH ROUTINE HEALING, SUBSEQUENT ENCOUNTER: ICD-10-CM

## 2020-06-05 DIAGNOSIS — T81.31XD DEHISCENCE OF OPERATIVE WOUND, SUBSEQUENT ENCOUNTER: ICD-10-CM

## 2020-06-05 DIAGNOSIS — S72.322F TYPE III OPEN DISPLACED TRANSVERSE FRACTURE OF SHAFT OF LEFT FEMUR WITH ROUTINE HEALING, SUBSEQUENT ENCOUNTER: Primary | ICD-10-CM

## 2020-06-05 PROCEDURE — 99024 PR POST-OP FOLLOW-UP VISIT: ICD-10-PCS | Mod: ,,, | Performed by: PHYSICIAN ASSISTANT

## 2020-06-05 PROCEDURE — 99999 PR PBB SHADOW E&M-EST. PATIENT-LVL III: CPT | Mod: PBBFAC,,, | Performed by: PHYSICIAN ASSISTANT

## 2020-06-05 PROCEDURE — 73552 XR FEMUR 2 VIEW LEFT: ICD-10-PCS | Mod: 26,LT,, | Performed by: RADIOLOGY

## 2020-06-05 PROCEDURE — 73552 X-RAY EXAM OF FEMUR 2/>: CPT | Mod: TC,LT

## 2020-06-05 PROCEDURE — 99024 POSTOP FOLLOW-UP VISIT: CPT | Mod: ,,, | Performed by: PHYSICIAN ASSISTANT

## 2020-06-05 PROCEDURE — 99213 OFFICE O/P EST LOW 20 MIN: CPT | Mod: PBBFAC,25 | Performed by: PHYSICIAN ASSISTANT

## 2020-06-05 PROCEDURE — 99999 PR PBB SHADOW E&M-EST. PATIENT-LVL III: ICD-10-PCS | Mod: PBBFAC,,, | Performed by: PHYSICIAN ASSISTANT

## 2020-06-05 PROCEDURE — 73552 X-RAY EXAM OF FEMUR 2/>: CPT | Mod: 26,LT,, | Performed by: RADIOLOGY

## 2020-06-08 ENCOUNTER — TELEPHONE (OUTPATIENT)
Dept: ORTHOPEDICS | Facility: CLINIC | Age: 30
End: 2020-06-08

## 2020-06-08 DIAGNOSIS — S72.322F TYPE III OPEN DISPLACED TRANSVERSE FRACTURE OF SHAFT OF LEFT FEMUR WITH ROUTINE HEALING, SUBSEQUENT ENCOUNTER: ICD-10-CM

## 2020-06-08 DIAGNOSIS — S72.492D CLOSED COMMINUTED INTRA-ARTICULAR FRACTURE OF DISTAL END OF LEFT FEMUR WITH ROUTINE HEALING, SUBSEQUENT ENCOUNTER: ICD-10-CM

## 2020-06-08 RX ORDER — HYDROCODONE BITARTRATE AND ACETAMINOPHEN 10; 325 MG/1; MG/1
1 TABLET ORAL
Qty: 42 TABLET | Refills: 0 | Status: SHIPPED | OUTPATIENT
Start: 2020-06-08 | End: 2020-06-12 | Stop reason: SDUPTHER

## 2020-06-08 NOTE — TELEPHONE ENCOUNTER
----- Message from Giovanni Weiss sent at 6/8/2020 10:42 AM CDT -----  Contact: Patient @ [phone]  Patient calling to get an update on the medication refill on (HYDROcodone-acetaminophen (NORCO)  mg per tablet )     [facility]/pharmacy #[id number] - [place] - [address]  [address] [place] [address]  Phone: [phone] Fax: [phone]

## 2020-06-08 NOTE — TELEPHONE ENCOUNTER
Spoke  with pt that his Rx for MOBIC was sent to the pharmacy. Patient states verbal understanding and has no further questions.

## 2020-06-08 NOTE — TELEPHONE ENCOUNTER
Spoke with pt.   Advised that script was sent in per Ivanna Chapman PA-C and that he has one more refill left that can be approved

## 2020-06-08 NOTE — TELEPHONE ENCOUNTER
----- Message from Ivanna Chapman PA-C sent at 6/8/2020 10:45 AM CDT -----  Contact: pt  I refilled his Norco 10, Please remind him  That he is only allowed 1 more refill then he will not recieve any more as we discussed at his previous visit.   Ivanna     ----- Message -----  From: Santa Shepherd MA  Sent: 6/8/2020   8:22 AM CDT  To: Ivanna Chapman PA-C        ----- Message -----  From: Lidya Brenner  Sent: 6/8/2020   8:15 AM CDT  To: Ari Goncalves Staff    Refill    Out of med    DIFFERENT   PHARMACY     oxyCODONE (ROXICODONE) 5 MG immediate release tablet    [facility]      [address] [place] [address]    Amina Cm

## 2020-06-12 DIAGNOSIS — S72.492D CLOSED COMMINUTED INTRA-ARTICULAR FRACTURE OF DISTAL END OF LEFT FEMUR WITH ROUTINE HEALING, SUBSEQUENT ENCOUNTER: ICD-10-CM

## 2020-06-12 DIAGNOSIS — S72.322F TYPE III OPEN DISPLACED TRANSVERSE FRACTURE OF SHAFT OF LEFT FEMUR WITH ROUTINE HEALING, SUBSEQUENT ENCOUNTER: ICD-10-CM

## 2020-06-12 RX ORDER — HYDROCODONE BITARTRATE AND ACETAMINOPHEN 10; 325 MG/1; MG/1
1 TABLET ORAL EVERY 8 HOURS PRN
Qty: 21 TABLET | Refills: 0 | Status: SHIPPED | OUTPATIENT
Start: 2020-06-12 | End: 2021-01-28

## 2020-07-14 ENCOUNTER — OFFICE VISIT (OUTPATIENT)
Dept: ORTHOPEDICS | Facility: CLINIC | Age: 30
End: 2020-07-14
Payer: MEDICAID

## 2020-07-14 ENCOUNTER — HOSPITAL ENCOUNTER (OUTPATIENT)
Dept: RADIOLOGY | Facility: HOSPITAL | Age: 30
Discharge: HOME | End: 2020-07-14
Attending: ORTHOPAEDIC SURGERY
Payer: MEDICAID

## 2020-07-14 DIAGNOSIS — S72.492D CLOSED COMMINUTED INTRA-ARTICULAR FRACTURE OF DISTAL END OF LEFT FEMUR WITH ROUTINE HEALING, SUBSEQUENT ENCOUNTER: Primary | ICD-10-CM

## 2020-07-14 DIAGNOSIS — S72.492D CLOSED COMMINUTED INTRA-ARTICULAR FRACTURE OF DISTAL END OF LEFT FEMUR WITH ROUTINE HEALING, SUBSEQUENT ENCOUNTER: ICD-10-CM

## 2020-07-14 DIAGNOSIS — S72.322F TYPE III OPEN DISPLACED TRANSVERSE FRACTURE OF SHAFT OF LEFT FEMUR WITH ROUTINE HEALING, SUBSEQUENT ENCOUNTER: Primary | ICD-10-CM

## 2020-07-14 PROCEDURE — 99999 PR PBB SHADOW E&M-EST. PATIENT-LVL I: CPT | Mod: PBBFAC,,, | Performed by: ORTHOPAEDIC SURGERY

## 2020-07-14 PROCEDURE — 73552 XR FEMUR 2 VIEW LEFT: ICD-10-PCS | Mod: 26,LT,, | Performed by: RADIOLOGY

## 2020-07-14 PROCEDURE — 99999 PR PBB SHADOW E&M-EST. PATIENT-LVL I: ICD-10-PCS | Mod: PBBFAC,,, | Performed by: ORTHOPAEDIC SURGERY

## 2020-07-14 PROCEDURE — 73552 X-RAY EXAM OF FEMUR 2/>: CPT | Mod: TC,LT

## 2020-07-14 PROCEDURE — 99211 OFF/OP EST MAY X REQ PHY/QHP: CPT | Mod: PBBFAC,25 | Performed by: ORTHOPAEDIC SURGERY

## 2020-07-14 PROCEDURE — 99214 OFFICE O/P EST MOD 30 MIN: CPT | Mod: S$PBB,,, | Performed by: ORTHOPAEDIC SURGERY

## 2020-07-14 PROCEDURE — 73552 X-RAY EXAM OF FEMUR 2/>: CPT | Mod: 26,LT,, | Performed by: RADIOLOGY

## 2020-07-14 PROCEDURE — 99214 PR OFFICE/OUTPT VISIT, EST, LEVL IV, 30-39 MIN: ICD-10-PCS | Mod: S$PBB,,, | Performed by: ORTHOPAEDIC SURGERY

## 2020-07-19 NOTE — PROGRESS NOTES
HPI: 29-year-old male status post left medial distal femoral fracture ORIF 03/06/2020 who subsequently fell while walking on the limb several weeks after surgery sustaining a supracondylar/shaft fracture above that construct with dehiscence of his distal medial incision.  I took him to the operating room on 04/01/2020 and washed out his medial incision to include use of Bactisure and then plated the femur laterally from the vastus ridge to the lateral femoral condyle.  He was more compliant with his nonweightbearing status after his 2nd surgery and has done quite well since that point time.  At last visit was made weight-bearing as tolerated is walking quite well.  He still feels a bit of weakness and has quad atrophy but overall is doing quite well.  He has had no signs or symptoms of infection around the area and no constitutional symptoms.  Pain is minimal.    ROS:  Patient denies constitutional symptoms, cardiac symptoms, respiratory symptoms, GI symptoms.  The remainder of the musculoskeletal ROS is included in the HPI.    PE:    AA&O x 4.  NAD  HEENT:  NCAT, sclera nonicteric  Lungs:  Respirations are equal and unlabored.  CV:  2+ bilateral upper and lower extremity pulses.  Skin:  Intact throughout.    MS:  Medial and lateral incisions all well healed.  Full range of motion the knee with no knee effusion.  No instability.    Rads:  Well-healed fractures throughout the femur with intact hardware and no signs of loosening or osteomyelitis.    A/P:  4.5 months status post ORIF of left medial femoral condyle fracture and 3.5 months status post ORIF of periprosthetic supracondylar/shaft fracture above this.  He is doing quite well at this point time and will continue weight-bearing as tolerated.  He continues to work on strengthening.  He is back at work on medium to light duty.  I encouraged him to continue with his physical therapy in getting more strength before increasing his activities at work.  I will see  him back 1 more time in about 2-3 months with x-rays of the left femur.  Sooner should he have any problems.

## 2020-08-06 ENCOUNTER — TELEPHONE (OUTPATIENT)
Dept: ORTHOPEDICS | Facility: CLINIC | Age: 30
End: 2020-08-06

## 2020-08-06 NOTE — TELEPHONE ENCOUNTER
----- Message from Karina Estrella sent at 8/6/2020 12:13 PM CDT -----  Patient called to speak with the doctor concerning a referral to extend physical therapy.    He would like a callback at [phone]    Thanks  KB

## 2020-08-06 NOTE — TELEPHONE ENCOUNTER
Spoke with pt who says he needs a new order for PT at [facility].    Spoke with Sachi at [facility] [phone]    States pt does not need a new order.   Pt's authorization .  Sachi will update auth with pt's insurance carrier.    Advised Sachi to contact me should she need anything.

## 2020-09-01 ENCOUNTER — TELEPHONE (OUTPATIENT)
Dept: ORTHOPEDICS | Facility: CLINIC | Age: 30
End: 2020-09-01

## 2020-09-01 NOTE — TELEPHONE ENCOUNTER
----- Message from Ruth Ríos sent at 9/1/2020  3:29 PM CDT -----  Pt asking for a callback regarding to his scheduled appt on 9/8 please contact pt             Contact info [phone]

## 2020-09-01 NOTE — TELEPHONE ENCOUNTER
Returned call to pt.   Reached pt's voice mail.  Unable to leave a voice mail due to pt's mail box is full at this time.

## 2021-01-20 ENCOUNTER — TELEPHONE (OUTPATIENT)
Dept: ORTHOPEDICS | Facility: CLINIC | Age: 31
End: 2021-01-20

## 2021-01-20 DIAGNOSIS — S72.492D CLOSED COMMINUTED INTRA-ARTICULAR FRACTURE OF DISTAL END OF LEFT FEMUR WITH ROUTINE HEALING, SUBSEQUENT ENCOUNTER: Primary | ICD-10-CM

## 2021-01-28 ENCOUNTER — OFFICE VISIT (OUTPATIENT)
Dept: ORTHOPEDICS | Facility: CLINIC | Age: 31
End: 2021-01-28
Payer: MEDICAID

## 2021-01-28 ENCOUNTER — HOSPITAL ENCOUNTER (OUTPATIENT)
Dept: RADIOLOGY | Facility: HOSPITAL | Age: 31
Discharge: HOME | End: 2021-01-28
Attending: ORTHOPAEDIC SURGERY
Payer: MEDICAID

## 2021-01-28 VITALS — WEIGHT: 178.13 LBS | HEIGHT: 70 IN | BODY MASS INDEX: 25.5 KG/M2

## 2021-01-28 DIAGNOSIS — S72.492D CLOSED COMMINUTED INTRA-ARTICULAR FRACTURE OF DISTAL END OF LEFT FEMUR WITH ROUTINE HEALING, SUBSEQUENT ENCOUNTER: ICD-10-CM

## 2021-01-28 DIAGNOSIS — S72.492D CLOSED COMMINUTED INTRA-ARTICULAR FRACTURE OF DISTAL END OF LEFT FEMUR WITH ROUTINE HEALING, SUBSEQUENT ENCOUNTER: Primary | ICD-10-CM

## 2021-01-28 DIAGNOSIS — S72.322F TYPE III OPEN DISPLACED TRANSVERSE FRACTURE OF SHAFT OF LEFT FEMUR WITH ROUTINE HEALING, SUBSEQUENT ENCOUNTER: ICD-10-CM

## 2021-01-28 PROBLEM — T81.31XA SURGICAL WOUND DEHISCENCE: Status: RESOLVED | Noted: 2020-04-01 | Resolved: 2021-01-28

## 2021-01-28 PROCEDURE — 73552 XR FEMUR 2 VIEW LEFT: ICD-10-PCS | Mod: 26,LT,, | Performed by: RADIOLOGY

## 2021-01-28 PROCEDURE — 99214 PR OFFICE/OUTPT VISIT, EST, LEVL IV, 30-39 MIN: ICD-10-PCS | Mod: S$PBB,,, | Performed by: ORTHOPAEDIC SURGERY

## 2021-01-28 PROCEDURE — 73552 X-RAY EXAM OF FEMUR 2/>: CPT | Mod: TC,LT

## 2021-01-28 PROCEDURE — 99214 OFFICE O/P EST MOD 30 MIN: CPT | Mod: S$PBB,,, | Performed by: ORTHOPAEDIC SURGERY

## 2021-01-28 PROCEDURE — 73552 X-RAY EXAM OF FEMUR 2/>: CPT | Mod: 26,LT,, | Performed by: RADIOLOGY

## 2021-01-28 PROCEDURE — 99999 PR PBB SHADOW E&M-EST. PATIENT-LVL II: ICD-10-PCS | Mod: PBBFAC,,, | Performed by: ORTHOPAEDIC SURGERY

## 2021-01-28 PROCEDURE — 99999 PR PBB SHADOW E&M-EST. PATIENT-LVL II: CPT | Mod: PBBFAC,,, | Performed by: ORTHOPAEDIC SURGERY

## 2021-01-28 PROCEDURE — 99212 OFFICE O/P EST SF 10 MIN: CPT | Mod: PBBFAC,25 | Performed by: ORTHOPAEDIC SURGERY

## 2021-01-28 RX ORDER — DICLOFENAC SODIUM 10 MG/G
GEL TOPICAL
COMMUNITY
Start: 2020-12-22

## 2021-01-28 RX ORDER — HYDROCODONE BITARTRATE AND ACETAMINOPHEN 5; 325 MG/1; MG/1
1 TABLET ORAL EVERY 6 HOURS PRN
COMMUNITY
Start: 2021-01-21

## 2021-01-28 RX ORDER — DULOXETIN HYDROCHLORIDE 30 MG/1
30 CAPSULE, DELAYED RELEASE ORAL DAILY
COMMUNITY
Start: 2021-01-21

## 2021-01-29 ENCOUNTER — TELEPHONE (OUTPATIENT)
Dept: ORTHOPEDICS | Facility: CLINIC | Age: 31
End: 2021-01-29

## 2021-02-23 ENCOUNTER — TELEPHONE (OUTPATIENT)
Dept: ORTHOPEDICS | Facility: CLINIC | Age: 31
End: 2021-02-23

## 2021-02-25 ENCOUNTER — TELEPHONE (OUTPATIENT)
Dept: ORTHOPEDICS | Facility: CLINIC | Age: 31
End: 2021-02-25

## 2021-02-25 DIAGNOSIS — S72.492D CLOSED COMMINUTED INTRA-ARTICULAR FRACTURE OF DISTAL END OF LEFT FEMUR WITH ROUTINE HEALING, SUBSEQUENT ENCOUNTER: Primary | ICD-10-CM

## 2021-04-19 ENCOUNTER — TELEPHONE (OUTPATIENT)
Dept: ORTHOPEDICS | Facility: CLINIC | Age: 31
End: 2021-04-19

## 2021-04-19 DIAGNOSIS — S72.322F TYPE III OPEN DISPLACED TRANSVERSE FRACTURE OF SHAFT OF LEFT FEMUR WITH ROUTINE HEALING, SUBSEQUENT ENCOUNTER: ICD-10-CM

## 2021-04-19 DIAGNOSIS — S72.492D CLOSED COMMINUTED INTRA-ARTICULAR FRACTURE OF DISTAL END OF LEFT FEMUR WITH ROUTINE HEALING, SUBSEQUENT ENCOUNTER: Primary | ICD-10-CM

## 2021-04-29 ENCOUNTER — TELEPHONE (OUTPATIENT)
Dept: ORTHOPEDICS | Facility: CLINIC | Age: 31
End: 2021-04-29

## (undated) DEVICE — CANISTER INFOV.A.C WOUND 500ML

## (undated) DEVICE — APPLICATOR CHLORAPREP ORN 26ML

## (undated) DEVICE — KIT DRAIN HEMOVAC 3/16IN 400ML

## (undated) DEVICE — BIT DRILL 3.2

## (undated) DEVICE — SEE MEDLINE ITEM 157131

## (undated) DEVICE — LAVAGE WOUND BACTISURE 1L BAG

## (undated) DEVICE — SCREW CANN CON 5.0X95MM
Type: IMPLANTABLE DEVICE | Site: FEMUR | Status: NON-FUNCTIONAL
Removed: 2020-04-01

## (undated) DEVICE — DRAPE C-ARM/MOBILE XRAY 44X80

## (undated) DEVICE — SEE MEDLINE ITEM 157150

## (undated) DEVICE — KIT DRESSING PREVENA PLUS

## (undated) DEVICE — SEE MEDLINE ITEM 152622

## (undated) DEVICE — DRAPE STERI U-SHAPED 47X51IN

## (undated) DEVICE — SEE MEDLINE ITEM 157117

## (undated) DEVICE — BLADE SURG CARBON STEEL #10

## (undated) DEVICE — PADDING CAST SPECIALIST 6X4YD

## (undated) DEVICE — DRAPE C ARM 42 X 120 10/BX

## (undated) DEVICE — TRAY FOLEY 16FR INFECTION CONT

## (undated) DEVICE — SUT FIBERWIRE 2 38 IN TAPER

## (undated) DEVICE — DRESSING AQUACEL AG 3.5X10IN

## (undated) DEVICE — SEE MEDLINE ITEM 152530

## (undated) DEVICE — STAPLER SKIN PROXIMATE WIDE

## (undated) DEVICE — DRESSING AQUACEL SACRAL 9 X 9

## (undated) DEVICE — SEE MEDLINE ITEM 154981

## (undated) DEVICE — SEE MEDLINE ITEM 146417

## (undated) DEVICE — SEE MEDLINE ITEM 157216

## (undated) DEVICE — COVER BACK TABLE 72X21

## (undated) DEVICE — DRAPE PLASTIC U 60X72

## (undated) DEVICE — DRAPE C-ARMOR EQUIPMENT COVER

## (undated) DEVICE — SEE MEDLINE ITEM 146292

## (undated) DEVICE — DRESSING XEROFORM 1X8IN

## (undated) DEVICE — BRACE KNEE T SCOPE PREMIER

## (undated) DEVICE — GLOVE BIOGEL ORTHOPEDIC 8

## (undated) DEVICE — GLOVE BIOGEL PI MICRO INDIC 8

## (undated) DEVICE — PULSAVAC ZIMMER

## (undated) DEVICE — SUT VICRYL 1 CT-1 27 UNDIE

## (undated) DEVICE — BIT DRILL QC STRL SS 2X125MM

## (undated) DEVICE — ELECTRODE REM PLYHSV RETURN 9

## (undated) DEVICE — GAUZE SPONGE 4X4 12PLY

## (undated) DEVICE — IMPLANTABLE DEVICE
Type: IMPLANTABLE DEVICE | Site: FEMUR | Status: NON-FUNCTIONAL
Removed: 2020-04-01

## (undated) DEVICE — COVER OVERHEAD SURG LT BLUE

## (undated) DEVICE — SET IRR URLGY 2LINE UNIV SPIKE

## (undated) DEVICE — SUT VICRYL PLUS 3-0 SH 18IN

## (undated) DEVICE — TAPE SURG DURAPORE 2 X10YD

## (undated) DEVICE — DRAPE TIBURON ORTHOPEDIC SPLIT

## (undated) DEVICE — BIT DRILL

## (undated) DEVICE — GOWN SURGICAL XX LARGE X LONG

## (undated) DEVICE — SEE MEDLINE ITEM 157166

## (undated) DEVICE — SPONGE LAP 18X18 PREWASHED

## (undated) DEVICE — BIT DRILL CANN QC 5X300X130MM

## (undated) DEVICE — BIT DRILL QC STRL SS 3.2X145

## (undated) DEVICE — SUT ETHILON 3/0 18IN PS-1

## (undated) DEVICE — DRESSING GAUZE 6PLY 4X4

## (undated) DEVICE — PADDING CAST 4IN SPECIALIST

## (undated) DEVICE — GUIDEWIRE ORTHO CANN 2X230MM
Type: IMPLANTABLE DEVICE | Site: FEMUR | Status: NON-FUNCTIONAL
Removed: 2020-04-01

## (undated) DEVICE — SUT VICRYL PLUS 0 CT1 18IN

## (undated) DEVICE — SUT CTD VICRYL 2.0

## (undated) DEVICE — PAD ABDOMINAL 5X9 STERILE

## (undated) DEVICE — CATH SUCTION 10FR

## (undated) DEVICE — MANIFOLD 4 PORT

## (undated) DEVICE — DRESSING XEROFORM FOIL PK 1X8

## (undated) DEVICE — BIT DRILL 3 FLTE QC 2.7X125MM

## (undated) DEVICE — SEE MEDLINE ITEM 146271

## (undated) DEVICE — SEE MEDLINE ITEM 157116

## (undated) DEVICE — GUIDEWIRE DRILL TIP 2.5X300

## (undated) DEVICE — PACK OPTIMA GEN ORTHO